# Patient Record
Sex: FEMALE | Race: WHITE | Employment: OTHER | URBAN - NONMETROPOLITAN AREA
[De-identification: names, ages, dates, MRNs, and addresses within clinical notes are randomized per-mention and may not be internally consistent; named-entity substitution may affect disease eponyms.]

---

## 2017-07-31 ENCOUNTER — HOSPITAL ENCOUNTER (OUTPATIENT)
Age: 52
Discharge: HOME | End: 2017-07-31
Payer: COMMERCIAL

## 2017-07-31 LAB
-: NORMAL
ABSOLUTE EOS #: 0.2 K/UL (ref 0–0.4)
ABSOLUTE LYMPH #: 2.2 K/UL (ref 1–4.8)
ABSOLUTE MONO #: 0.2 K/UL (ref 0–1)
ALBUMIN SERPL-MCNC: 4 G/DL (ref 3.5–5.2)
ALBUMIN/GLOBULIN RATIO: 1.1 (ref 1–2.5)
ALP BLD-CCNC: 88 U/L (ref 35–104)
ALT SERPL-CCNC: 8 U/L (ref 5–33)
AMORPHOUS: NORMAL
ANION GAP SERPL CALCULATED.3IONS-SCNC: 11 MMOL/L (ref 9–17)
AST SERPL-CCNC: 16 U/L
BACTERIA: NORMAL
BASOPHILS # BLD: 1 %
BASOPHILS ABSOLUTE: 0 K/UL (ref 0–0.2)
BILIRUB SERPL-MCNC: 0.3 MG/DL (ref 0.3–1.2)
BILIRUBIN URINE: NEGATIVE
BUN BLDV-MCNC: 8 MG/DL (ref 6–20)
BUN/CREAT BLD: 8 (ref 9–20)
CALCIUM SERPL-MCNC: 9 MG/DL (ref 8.6–10.4)
CASTS UA: NORMAL /LPF
CHLORIDE BLD-SCNC: 107 MMOL/L (ref 98–107)
CHOLESTEROL/HDL RATIO: 4.3
CHOLESTEROL: 205 MG/DL
CO2: 21 MMOL/L (ref 20–31)
COLOR: YELLOW
COMMENT UA: ABNORMAL
CREAT SERPL-MCNC: 1 MG/DL (ref 0.5–0.9)
CREATININE URINE: 44.1 MG/DL (ref 28–217)
CRYSTALS, UA: NORMAL /HPF
DIFFERENTIAL TYPE: ABNORMAL
EOSINOPHILS RELATIVE PERCENT: 4 %
EPITHELIAL CELLS UA: NORMAL /HPF (ref 0–25)
ESTIMATED AVERAGE GLUCOSE: 94 MG/DL
GFR AFRICAN AMERICAN: >60 ML/MIN
GFR NON-AFRICAN AMERICAN: 58 ML/MIN
GFR SERPL CREATININE-BSD FRML MDRD: ABNORMAL ML/MIN/{1.73_M2}
GFR SERPL CREATININE-BSD FRML MDRD: ABNORMAL ML/MIN/{1.73_M2}
GLUCOSE BLD-MCNC: 90 MG/DL (ref 70–99)
GLUCOSE URINE: NEGATIVE
HBA1C MFR BLD: 4.9 % (ref 4.8–5.9)
HCT VFR BLD CALC: 36.3 % (ref 36–46)
HDLC SERPL-MCNC: 48 MG/DL
HEMOGLOBIN: 12.1 G/DL (ref 12–16)
KETONES, URINE: NEGATIVE
LDL CHOLESTEROL: 119 MG/DL (ref 0–130)
LEUKOCYTE ESTERASE, URINE: NEGATIVE
LYMPHOCYTES # BLD: 45 %
MCH RBC QN AUTO: 31.3 PG (ref 26–34)
MCHC RBC AUTO-ENTMCNC: 33.3 G/DL (ref 31–37)
MCV RBC AUTO: 93.8 FL (ref 80–100)
MICROALBUMIN/CREAT 24H UR: <12 MG/L
MICROALBUMIN/CREAT UR-RTO: 27 MCG/MG CREAT
MONOCYTES # BLD: 4 %
MUCUS: NORMAL
NITRITE, URINE: NEGATIVE
OTHER OBSERVATIONS UA: NORMAL
PDW BLD-RTO: 14.8 % (ref 12.1–15.2)
PH UA: 6 (ref 5–9)
PLATELET # BLD: 122 K/UL (ref 140–450)
PLATELET ESTIMATE: ABNORMAL
PMV BLD AUTO: 9.8 FL (ref 6–12)
POTASSIUM SERPL-SCNC: 4.3 MMOL/L (ref 3.7–5.3)
PROTEIN UA: NEGATIVE
RBC # BLD: 3.86 M/UL (ref 4–5.2)
RBC # BLD: ABNORMAL 10*6/UL
RBC UA: NORMAL /HPF (ref 0–2)
RENAL EPITHELIAL, UA: NORMAL /HPF
SEG NEUTROPHILS: 46 %
SEGMENTED NEUTROPHILS ABSOLUTE COUNT: 2.3 K/UL (ref 1.8–7.7)
SODIUM BLD-SCNC: 139 MMOL/L (ref 135–144)
SPECIFIC GRAVITY UA: <1.005 (ref 1.01–1.02)
TOTAL PROTEIN: 7.6 G/DL (ref 6.4–8.3)
TRICHOMONAS: NORMAL
TRIGL SERPL-MCNC: 190 MG/DL
TURBIDITY: CLEAR
URINE HGB: NEGATIVE
UROBILINOGEN, URINE: NORMAL
VLDLC SERPL CALC-MCNC: ABNORMAL MG/DL (ref 1–30)
WBC # BLD: 5 K/UL (ref 3.5–11)
WBC # BLD: ABNORMAL 10*3/UL
WBC UA: NORMAL /HPF (ref 0–5)
YEAST: NORMAL

## 2017-07-31 PROCEDURE — 36415 COLL VENOUS BLD VENIPUNCTURE: CPT

## 2017-07-31 PROCEDURE — 83036 HEMOGLOBIN GLYCOSYLATED A1C: CPT

## 2017-07-31 PROCEDURE — 81001 URINALYSIS AUTO W/SCOPE: CPT

## 2017-07-31 PROCEDURE — 80053 COMPREHEN METABOLIC PANEL: CPT

## 2017-07-31 PROCEDURE — 82570 ASSAY OF URINE CREATININE: CPT

## 2017-07-31 PROCEDURE — 85025 COMPLETE CBC W/AUTO DIFF WBC: CPT

## 2017-07-31 PROCEDURE — 82043 UR ALBUMIN QUANTITATIVE: CPT

## 2017-07-31 PROCEDURE — 80061 LIPID PANEL: CPT

## 2017-08-01 ENCOUNTER — TELEPHONE (OUTPATIENT)
Dept: GASTROENTEROLOGY | Age: 52
End: 2017-08-01

## 2017-08-01 DIAGNOSIS — Z12.11 COLON CANCER SCREENING: Primary | ICD-10-CM

## 2017-08-03 PROBLEM — Z12.11 COLON CANCER SCREENING: Status: ACTIVE | Noted: 2017-08-03

## 2017-08-03 RX ORDER — SODIUM, POTASSIUM,MAG SULFATES 17.5-3.13G
SOLUTION, RECONSTITUTED, ORAL ORAL
Qty: 2 BOTTLE | Refills: 0 | OUTPATIENT
Start: 2017-08-03 | End: 2018-05-10 | Stop reason: ALTCHOICE

## 2018-02-05 ENCOUNTER — HOSPITAL ENCOUNTER (OUTPATIENT)
Dept: GENERAL RADIOLOGY | Age: 53
Discharge: HOME | End: 2018-02-07
Payer: COMMERCIAL

## 2018-02-05 ENCOUNTER — HOSPITAL ENCOUNTER (OUTPATIENT)
Age: 53
Discharge: HOME | End: 2018-02-07
Payer: COMMERCIAL

## 2018-02-05 DIAGNOSIS — R07.9 CHEST PAIN, UNSPECIFIED TYPE: ICD-10-CM

## 2018-02-05 DIAGNOSIS — R06.02 BREATH SHORTNESS: ICD-10-CM

## 2018-02-05 DIAGNOSIS — J44.9 CHRONIC OBSTRUCTIVE PULMONARY DISEASE, UNSPECIFIED COPD TYPE (HCC): ICD-10-CM

## 2018-02-05 PROCEDURE — 71046 X-RAY EXAM CHEST 2 VIEWS: CPT

## 2018-03-09 ENCOUNTER — HOSPITAL ENCOUNTER (OUTPATIENT)
Dept: ULTRASOUND IMAGING | Age: 53
Discharge: HOME | End: 2018-03-11
Payer: COMMERCIAL

## 2018-03-09 DIAGNOSIS — N93.0 POSTCOITAL BLEEDING: ICD-10-CM

## 2018-03-09 PROCEDURE — 76856 US EXAM PELVIC COMPLETE: CPT

## 2018-05-08 DIAGNOSIS — Z95.2 S/P AVR (AORTIC VALVE REPLACEMENT): ICD-10-CM

## 2018-05-08 DIAGNOSIS — Z79.01 LONG TERM CURRENT USE OF ANTICOAGULANT THERAPY: ICD-10-CM

## 2018-05-10 ENCOUNTER — HOSPITAL ENCOUNTER (OUTPATIENT)
Dept: PHARMACY | Age: 53
Setting detail: THERAPIES SERIES
Discharge: HOME | End: 2018-05-10
Payer: COMMERCIAL

## 2018-05-10 DIAGNOSIS — Z95.2 S/P AVR (AORTIC VALVE REPLACEMENT): ICD-10-CM

## 2018-05-10 DIAGNOSIS — Z79.01 LONG TERM CURRENT USE OF ANTICOAGULANT THERAPY: ICD-10-CM

## 2018-05-10 LAB — INR BLD: 1.9

## 2018-05-10 PROCEDURE — 99211 OFF/OP EST MAY X REQ PHY/QHP: CPT | Performed by: FAMILY MEDICINE

## 2018-05-10 PROCEDURE — 85610 PROTHROMBIN TIME: CPT | Performed by: FAMILY MEDICINE

## 2018-05-10 RX ORDER — ALPRAZOLAM 0.5 MG/1
0.5 TABLET ORAL DAILY PRN
COMMUNITY
Start: 2018-04-13

## 2018-05-10 RX ORDER — CITALOPRAM 20 MG/1
20 TABLET ORAL DAILY
COMMUNITY
Start: 2018-05-03 | End: 2019-06-07 | Stop reason: DRUGHIGH

## 2018-05-10 RX ORDER — ZONISAMIDE 100 MG/1
200 CAPSULE ORAL DAILY
COMMUNITY
Start: 2017-11-01 | End: 2022-10-27 | Stop reason: ALTCHOICE

## 2018-05-10 RX ORDER — WARFARIN SODIUM 2.5 MG/1
2.5 TABLET ORAL SEE ADMIN INSTRUCTIONS
COMMUNITY
Start: 2018-05-03

## 2018-05-10 RX ORDER — METOPROLOL SUCCINATE 25 MG/1
25 TABLET, EXTENDED RELEASE ORAL 2 TIMES DAILY
COMMUNITY
Start: 2018-05-02

## 2018-05-10 RX ORDER — ACETAMINOPHEN 325 MG/1
650 TABLET ORAL EVERY 4 HOURS PRN
COMMUNITY
Start: 2018-05-02

## 2018-05-10 RX ORDER — ASCORBIC ACID 500 MG
500 TABLET ORAL DAILY
COMMUNITY
Start: 2018-05-03 | End: 2018-06-01 | Stop reason: ALTCHOICE

## 2018-05-10 RX ORDER — ROSUVASTATIN CALCIUM 20 MG/1
20 TABLET, COATED ORAL NIGHTLY
COMMUNITY
Start: 2018-05-02

## 2018-05-10 RX ORDER — ALBUTEROL SULFATE 90 UG/1
2 AEROSOL, METERED RESPIRATORY (INHALATION) EVERY 6 HOURS PRN
COMMUNITY
Start: 2018-05-02

## 2018-05-10 RX ORDER — FOLIC ACID 1 MG/1
1 TABLET ORAL DAILY
COMMUNITY
Start: 2018-05-03 | End: 2018-06-01 | Stop reason: ALTCHOICE

## 2018-05-10 RX ORDER — OMEPRAZOLE 20 MG/1
20 CAPSULE, DELAYED RELEASE ORAL DAILY
COMMUNITY
Start: 2018-02-01

## 2018-05-10 RX ORDER — ARIPIPRAZOLE 5 MG/1
10 TABLET ORAL DAILY
COMMUNITY
Start: 2018-04-06 | End: 2020-10-07 | Stop reason: DRUGHIGH

## 2018-05-10 RX ORDER — TOPIRAMATE 50 MG/1
50 TABLET, FILM COATED ORAL 2 TIMES DAILY
COMMUNITY
Start: 2018-04-13 | End: 2020-10-21 | Stop reason: ALTCHOICE

## 2018-05-10 RX ORDER — ASPIRIN 81 MG
1 TABLET, DELAYED RELEASE (ENTERIC COATED) ORAL DAILY
COMMUNITY
Start: 2018-05-03 | End: 2018-06-01 | Stop reason: ALTCHOICE

## 2018-05-10 RX ORDER — OXYCODONE HYDROCHLORIDE 5 MG/1
5 TABLET ORAL EVERY 6 HOURS PRN
COMMUNITY
End: 2018-06-01 | Stop reason: ALTCHOICE

## 2018-05-10 RX ORDER — IRON POLYSACCHARIDE COMPLEX 150 MG
150 CAPSULE ORAL DAILY
COMMUNITY
Start: 2018-05-03 | End: 2018-06-01 | Stop reason: ALTCHOICE

## 2018-05-10 RX ORDER — DOCUSATE SODIUM 100 MG/1
100 CAPSULE, LIQUID FILLED ORAL 2 TIMES DAILY
COMMUNITY
Start: 2018-05-02 | End: 2018-06-01 | Stop reason: ALTCHOICE

## 2018-05-24 ENCOUNTER — HOSPITAL ENCOUNTER (OUTPATIENT)
Dept: PHARMACY | Age: 53
Setting detail: THERAPIES SERIES
Discharge: HOME | End: 2018-05-24
Payer: COMMERCIAL

## 2018-05-24 VITALS
WEIGHT: 163 LBS | DIASTOLIC BLOOD PRESSURE: 66 MMHG | BODY MASS INDEX: 25.53 KG/M2 | HEART RATE: 95 BPM | SYSTOLIC BLOOD PRESSURE: 107 MMHG

## 2018-05-24 DIAGNOSIS — Z95.2 S/P AVR (AORTIC VALVE REPLACEMENT): ICD-10-CM

## 2018-05-24 DIAGNOSIS — Z79.01 LONG TERM CURRENT USE OF ANTICOAGULANT THERAPY: ICD-10-CM

## 2018-05-24 LAB — INR BLD: 1.5

## 2018-05-24 PROCEDURE — 99211 OFF/OP EST MAY X REQ PHY/QHP: CPT

## 2018-05-24 PROCEDURE — 85610 PROTHROMBIN TIME: CPT

## 2018-06-01 ENCOUNTER — HOSPITAL ENCOUNTER (OUTPATIENT)
Dept: PHARMACY | Age: 53
Setting detail: THERAPIES SERIES
Discharge: HOME | End: 2018-06-01
Payer: COMMERCIAL

## 2018-06-01 ENCOUNTER — OFFICE VISIT (OUTPATIENT)
Dept: CARDIOLOGY | Age: 53
End: 2018-06-01
Payer: COMMERCIAL

## 2018-06-01 VITALS
OXYGEN SATURATION: 98 % | SYSTOLIC BLOOD PRESSURE: 91 MMHG | WEIGHT: 168.4 LBS | DIASTOLIC BLOOD PRESSURE: 61 MMHG | HEART RATE: 94 BPM | RESPIRATION RATE: 16 BRPM | HEIGHT: 67 IN | BODY MASS INDEX: 26.43 KG/M2

## 2018-06-01 VITALS — DIASTOLIC BLOOD PRESSURE: 61 MMHG | SYSTOLIC BLOOD PRESSURE: 91 MMHG

## 2018-06-01 DIAGNOSIS — Z86.73 HISTORY OF STROKE: ICD-10-CM

## 2018-06-01 DIAGNOSIS — Z79.01 LONG TERM CURRENT USE OF ANTICOAGULANT THERAPY: ICD-10-CM

## 2018-06-01 DIAGNOSIS — D68.61 ANTIPHOSPHOLIPID SYNDROME (HCC): ICD-10-CM

## 2018-06-01 DIAGNOSIS — Z95.2 S/P AVR (AORTIC VALVE REPLACEMENT): Primary | ICD-10-CM

## 2018-06-01 DIAGNOSIS — Z95.2 S/P AVR (AORTIC VALVE REPLACEMENT): ICD-10-CM

## 2018-06-01 LAB — INR BLD: 1.8

## 2018-06-01 PROCEDURE — G8419 CALC BMI OUT NRM PARAM NOF/U: HCPCS | Performed by: INTERNAL MEDICINE

## 2018-06-01 PROCEDURE — 99244 OFF/OP CNSLTJ NEW/EST MOD 40: CPT | Performed by: INTERNAL MEDICINE

## 2018-06-01 PROCEDURE — 99211 OFF/OP EST MAY X REQ PHY/QHP: CPT

## 2018-06-01 PROCEDURE — G8427 DOCREV CUR MEDS BY ELIG CLIN: HCPCS | Performed by: INTERNAL MEDICINE

## 2018-06-01 PROCEDURE — 3017F COLORECTAL CA SCREEN DOC REV: CPT | Performed by: INTERNAL MEDICINE

## 2018-06-01 PROCEDURE — 85610 PROTHROMBIN TIME: CPT

## 2018-06-08 ENCOUNTER — HOSPITAL ENCOUNTER (OUTPATIENT)
Dept: PHARMACY | Age: 53
Setting detail: THERAPIES SERIES
Discharge: HOME | End: 2018-06-08
Payer: COMMERCIAL

## 2018-06-08 VITALS
BODY MASS INDEX: 24.43 KG/M2 | DIASTOLIC BLOOD PRESSURE: 69 MMHG | HEART RATE: 94 BPM | WEIGHT: 156 LBS | SYSTOLIC BLOOD PRESSURE: 96 MMHG

## 2018-06-08 DIAGNOSIS — Z95.2 S/P AVR (AORTIC VALVE REPLACEMENT): ICD-10-CM

## 2018-06-08 DIAGNOSIS — Z79.01 LONG TERM CURRENT USE OF ANTICOAGULANT THERAPY: ICD-10-CM

## 2018-06-08 LAB — INR BLD: 1.8

## 2018-06-08 PROCEDURE — 99211 OFF/OP EST MAY X REQ PHY/QHP: CPT

## 2018-06-08 PROCEDURE — 85610 PROTHROMBIN TIME: CPT

## 2018-06-11 ENCOUNTER — HOSPITAL ENCOUNTER (OUTPATIENT)
Dept: CARDIAC REHAB | Age: 53
Setting detail: THERAPIES SERIES
Discharge: HOME | End: 2018-06-11
Payer: COMMERCIAL

## 2018-06-11 VITALS
OXYGEN SATURATION: 98 % | SYSTOLIC BLOOD PRESSURE: 100 MMHG | DIASTOLIC BLOOD PRESSURE: 66 MMHG | WEIGHT: 161 LBS | HEART RATE: 86 BPM | BODY MASS INDEX: 25.27 KG/M2 | RESPIRATION RATE: 16 BRPM | HEIGHT: 67 IN

## 2018-06-11 ASSESSMENT — PATIENT HEALTH QUESTIONNAIRE - PHQ9: SUM OF ALL RESPONSES TO PHQ QUESTIONS 1-9: 0

## 2018-06-13 ENCOUNTER — HOSPITAL ENCOUNTER (OUTPATIENT)
Dept: CARDIAC REHAB | Age: 53
Setting detail: THERAPIES SERIES
Discharge: HOME | End: 2018-06-13
Payer: COMMERCIAL

## 2018-06-13 PROCEDURE — 93798 PHYS/QHP OP CAR RHAB W/ECG: CPT

## 2018-06-14 ENCOUNTER — HOSPITAL ENCOUNTER (OUTPATIENT)
Dept: CARDIAC REHAB | Age: 53
Setting detail: THERAPIES SERIES
Discharge: HOME | End: 2018-06-14
Payer: COMMERCIAL

## 2018-06-14 PROCEDURE — 93798 PHYS/QHP OP CAR RHAB W/ECG: CPT

## 2018-06-18 ENCOUNTER — HOSPITAL ENCOUNTER (OUTPATIENT)
Dept: CARDIAC REHAB | Age: 53
Setting detail: THERAPIES SERIES
Discharge: HOME | End: 2018-06-18
Payer: COMMERCIAL

## 2018-06-18 PROCEDURE — 93798 PHYS/QHP OP CAR RHAB W/ECG: CPT

## 2018-06-20 ENCOUNTER — HOSPITAL ENCOUNTER (OUTPATIENT)
Dept: CARDIAC REHAB | Age: 53
Setting detail: THERAPIES SERIES
Discharge: HOME | End: 2018-06-20
Payer: COMMERCIAL

## 2018-06-20 PROCEDURE — 93798 PHYS/QHP OP CAR RHAB W/ECG: CPT

## 2018-06-21 ENCOUNTER — HOSPITAL ENCOUNTER (OUTPATIENT)
Dept: CARDIAC REHAB | Age: 53
Setting detail: THERAPIES SERIES
Discharge: HOME | End: 2018-06-21
Payer: COMMERCIAL

## 2018-06-21 PROCEDURE — 93798 PHYS/QHP OP CAR RHAB W/ECG: CPT

## 2018-06-22 ENCOUNTER — HOSPITAL ENCOUNTER (OUTPATIENT)
Dept: PHARMACY | Age: 53
Setting detail: THERAPIES SERIES
Discharge: HOME | End: 2018-06-22
Payer: COMMERCIAL

## 2018-06-22 VITALS
SYSTOLIC BLOOD PRESSURE: 92 MMHG | WEIGHT: 167 LBS | DIASTOLIC BLOOD PRESSURE: 64 MMHG | BODY MASS INDEX: 26.16 KG/M2 | HEART RATE: 78 BPM

## 2018-06-22 DIAGNOSIS — Z79.01 LONG TERM CURRENT USE OF ANTICOAGULANT THERAPY: ICD-10-CM

## 2018-06-22 DIAGNOSIS — Z95.2 S/P AVR (AORTIC VALVE REPLACEMENT): ICD-10-CM

## 2018-06-22 LAB — INR BLD: 1.7

## 2018-06-22 PROCEDURE — 85610 PROTHROMBIN TIME: CPT

## 2018-06-22 PROCEDURE — 99211 OFF/OP EST MAY X REQ PHY/QHP: CPT

## 2018-06-25 ENCOUNTER — HOSPITAL ENCOUNTER (OUTPATIENT)
Dept: CARDIAC REHAB | Age: 53
Setting detail: THERAPIES SERIES
Discharge: HOME | End: 2018-06-25
Payer: COMMERCIAL

## 2018-06-25 PROCEDURE — 93798 PHYS/QHP OP CAR RHAB W/ECG: CPT

## 2018-06-27 ENCOUNTER — HOSPITAL ENCOUNTER (OUTPATIENT)
Dept: CARDIAC REHAB | Age: 53
Setting detail: THERAPIES SERIES
Discharge: HOME | End: 2018-06-27
Payer: COMMERCIAL

## 2018-06-27 PROCEDURE — 93798 PHYS/QHP OP CAR RHAB W/ECG: CPT

## 2018-06-28 ENCOUNTER — HOSPITAL ENCOUNTER (OUTPATIENT)
Dept: CARDIAC REHAB | Age: 53
Setting detail: THERAPIES SERIES
Discharge: HOME | End: 2018-06-28
Payer: COMMERCIAL

## 2018-06-28 PROCEDURE — 93798 PHYS/QHP OP CAR RHAB W/ECG: CPT

## 2018-07-02 ENCOUNTER — HOSPITAL ENCOUNTER (OUTPATIENT)
Dept: CARDIAC REHAB | Age: 53
Setting detail: THERAPIES SERIES
Discharge: HOME | End: 2018-07-02
Payer: COMMERCIAL

## 2018-07-02 PROCEDURE — 93798 PHYS/QHP OP CAR RHAB W/ECG: CPT

## 2018-07-05 ENCOUNTER — HOSPITAL ENCOUNTER (OUTPATIENT)
Dept: PHARMACY | Age: 53
Setting detail: THERAPIES SERIES
Discharge: HOME | End: 2018-07-05
Payer: COMMERCIAL

## 2018-07-05 ENCOUNTER — HOSPITAL ENCOUNTER (OUTPATIENT)
Dept: CARDIAC REHAB | Age: 53
Setting detail: THERAPIES SERIES
Discharge: HOME | End: 2018-07-05
Payer: COMMERCIAL

## 2018-07-05 VITALS
BODY MASS INDEX: 26 KG/M2 | DIASTOLIC BLOOD PRESSURE: 59 MMHG | SYSTOLIC BLOOD PRESSURE: 112 MMHG | HEART RATE: 79 BPM | WEIGHT: 166 LBS

## 2018-07-05 DIAGNOSIS — Z95.2 S/P AVR (AORTIC VALVE REPLACEMENT): ICD-10-CM

## 2018-07-05 DIAGNOSIS — Z79.01 LONG TERM CURRENT USE OF ANTICOAGULANT THERAPY: ICD-10-CM

## 2018-07-05 LAB — INR BLD: 3.4

## 2018-07-05 PROCEDURE — 99211 OFF/OP EST MAY X REQ PHY/QHP: CPT

## 2018-07-05 PROCEDURE — 85610 PROTHROMBIN TIME: CPT

## 2018-07-05 PROCEDURE — 93798 PHYS/QHP OP CAR RHAB W/ECG: CPT

## 2018-07-05 NOTE — PROGRESS NOTES
Patient states no visible blood in urine and no black tarry stool. No change in other medications. Will return to clinic in 2 weeks. Patient will hold warfarin dose today then decrease dosage slightly to 2.5 mg on Monday and 5 mg all other days.

## 2018-07-09 ENCOUNTER — HOSPITAL ENCOUNTER (OUTPATIENT)
Dept: CARDIAC REHAB | Age: 53
Setting detail: THERAPIES SERIES
Discharge: HOME | End: 2018-07-09
Payer: COMMERCIAL

## 2018-07-09 PROCEDURE — 93798 PHYS/QHP OP CAR RHAB W/ECG: CPT

## 2018-07-11 ENCOUNTER — HOSPITAL ENCOUNTER (OUTPATIENT)
Dept: CARDIAC REHAB | Age: 53
Setting detail: THERAPIES SERIES
Discharge: HOME | End: 2018-07-11
Payer: COMMERCIAL

## 2018-07-11 PROCEDURE — 93798 PHYS/QHP OP CAR RHAB W/ECG: CPT

## 2018-07-12 ENCOUNTER — HOSPITAL ENCOUNTER (OUTPATIENT)
Dept: CARDIAC REHAB | Age: 53
Setting detail: THERAPIES SERIES
Discharge: HOME | End: 2018-07-12
Payer: COMMERCIAL

## 2018-07-12 ENCOUNTER — HOSPITAL ENCOUNTER (OUTPATIENT)
Dept: NUTRITION | Age: 53
Discharge: HOME | End: 2018-07-12
Payer: COMMERCIAL

## 2018-07-12 PROCEDURE — 93798 PHYS/QHP OP CAR RHAB W/ECG: CPT

## 2018-07-16 ENCOUNTER — HOSPITAL ENCOUNTER (OUTPATIENT)
Dept: CARDIAC REHAB | Age: 53
Setting detail: THERAPIES SERIES
Discharge: HOME | End: 2018-07-16
Payer: COMMERCIAL

## 2018-07-16 PROCEDURE — 93798 PHYS/QHP OP CAR RHAB W/ECG: CPT

## 2018-07-18 ENCOUNTER — HOSPITAL ENCOUNTER (OUTPATIENT)
Dept: CARDIAC REHAB | Age: 53
Setting detail: THERAPIES SERIES
Discharge: HOME | End: 2018-07-18
Payer: COMMERCIAL

## 2018-07-18 PROCEDURE — 93798 PHYS/QHP OP CAR RHAB W/ECG: CPT

## 2018-07-19 ENCOUNTER — HOSPITAL ENCOUNTER (OUTPATIENT)
Dept: PHARMACY | Age: 53
Setting detail: THERAPIES SERIES
Discharge: HOME | End: 2018-07-19
Payer: COMMERCIAL

## 2018-07-19 ENCOUNTER — HOSPITAL ENCOUNTER (OUTPATIENT)
Dept: CARDIAC REHAB | Age: 53
Setting detail: THERAPIES SERIES
Discharge: HOME | End: 2018-07-19
Payer: COMMERCIAL

## 2018-07-19 DIAGNOSIS — Z95.2 S/P AVR (AORTIC VALVE REPLACEMENT): ICD-10-CM

## 2018-07-19 DIAGNOSIS — Z79.01 LONG TERM CURRENT USE OF ANTICOAGULANT THERAPY: ICD-10-CM

## 2018-07-19 LAB — INR BLD: 3.1

## 2018-07-19 PROCEDURE — 85610 PROTHROMBIN TIME: CPT | Performed by: FAMILY MEDICINE

## 2018-07-19 PROCEDURE — 99211 OFF/OP EST MAY X REQ PHY/QHP: CPT | Performed by: FAMILY MEDICINE

## 2018-07-19 PROCEDURE — 93798 PHYS/QHP OP CAR RHAB W/ECG: CPT

## 2018-07-23 ENCOUNTER — HOSPITAL ENCOUNTER (OUTPATIENT)
Dept: CARDIAC REHAB | Age: 53
Setting detail: THERAPIES SERIES
Discharge: HOME | End: 2018-07-23
Payer: COMMERCIAL

## 2018-07-23 PROCEDURE — 93798 PHYS/QHP OP CAR RHAB W/ECG: CPT

## 2018-07-25 ENCOUNTER — HOSPITAL ENCOUNTER (OUTPATIENT)
Dept: CARDIAC REHAB | Age: 53
Setting detail: THERAPIES SERIES
Discharge: HOME | End: 2018-07-25
Payer: COMMERCIAL

## 2018-07-25 PROCEDURE — 93798 PHYS/QHP OP CAR RHAB W/ECG: CPT

## 2018-07-26 ENCOUNTER — HOSPITAL ENCOUNTER (OUTPATIENT)
Dept: CARDIAC REHAB | Age: 53
Setting detail: THERAPIES SERIES
Discharge: HOME | End: 2018-07-26
Payer: COMMERCIAL

## 2018-07-26 PROCEDURE — 93798 PHYS/QHP OP CAR RHAB W/ECG: CPT

## 2018-07-30 ENCOUNTER — HOSPITAL ENCOUNTER (OUTPATIENT)
Dept: PHARMACY | Age: 53
Setting detail: THERAPIES SERIES
Discharge: HOME | End: 2018-07-30
Payer: COMMERCIAL

## 2018-07-30 ENCOUNTER — HOSPITAL ENCOUNTER (OUTPATIENT)
Dept: CARDIAC REHAB | Age: 53
Setting detail: THERAPIES SERIES
Discharge: HOME | End: 2018-07-30
Payer: COMMERCIAL

## 2018-07-30 VITALS
SYSTOLIC BLOOD PRESSURE: 92 MMHG | HEART RATE: 83 BPM | BODY MASS INDEX: 26 KG/M2 | DIASTOLIC BLOOD PRESSURE: 63 MMHG | WEIGHT: 166 LBS

## 2018-07-30 DIAGNOSIS — Z95.2 S/P AVR (AORTIC VALVE REPLACEMENT): ICD-10-CM

## 2018-07-30 DIAGNOSIS — Z79.01 LONG TERM CURRENT USE OF ANTICOAGULANT THERAPY: ICD-10-CM

## 2018-07-30 LAB — INR BLD: 2

## 2018-07-30 PROCEDURE — 93798 PHYS/QHP OP CAR RHAB W/ECG: CPT

## 2018-07-30 PROCEDURE — 99211 OFF/OP EST MAY X REQ PHY/QHP: CPT

## 2018-07-30 PROCEDURE — 85610 PROTHROMBIN TIME: CPT

## 2018-07-30 NOTE — PROGRESS NOTES
Patient states no visible blood in urine and no black tarry stool. No change in other medications. Will return to clinic in 2 weeks.

## 2018-07-30 NOTE — PATIENT INSTRUCTIONS
Continue to monitor urine and stool. Continue to monitor for signs of bleeding. Return to clinic in 2 weeks.

## 2018-08-01 ENCOUNTER — HOSPITAL ENCOUNTER (OUTPATIENT)
Dept: CARDIAC REHAB | Age: 53
Setting detail: THERAPIES SERIES
Discharge: HOME | End: 2018-08-01
Payer: COMMERCIAL

## 2018-08-01 PROCEDURE — 93798 PHYS/QHP OP CAR RHAB W/ECG: CPT

## 2018-08-02 ENCOUNTER — HOSPITAL ENCOUNTER (OUTPATIENT)
Dept: CARDIAC REHAB | Age: 53
Setting detail: THERAPIES SERIES
Discharge: HOME | End: 2018-08-02
Payer: COMMERCIAL

## 2018-08-02 PROCEDURE — 93798 PHYS/QHP OP CAR RHAB W/ECG: CPT

## 2018-08-08 ENCOUNTER — HOSPITAL ENCOUNTER (OUTPATIENT)
Dept: CARDIAC REHAB | Age: 53
Setting detail: THERAPIES SERIES
Discharge: HOME | End: 2018-08-08
Payer: COMMERCIAL

## 2018-08-08 PROCEDURE — 93798 PHYS/QHP OP CAR RHAB W/ECG: CPT

## 2018-08-09 ENCOUNTER — HOSPITAL ENCOUNTER (OUTPATIENT)
Dept: CARDIAC REHAB | Age: 53
Setting detail: THERAPIES SERIES
Discharge: HOME | End: 2018-08-09
Payer: COMMERCIAL

## 2018-08-09 PROCEDURE — 93798 PHYS/QHP OP CAR RHAB W/ECG: CPT

## 2018-08-13 ENCOUNTER — HOSPITAL ENCOUNTER (OUTPATIENT)
Dept: CARDIAC REHAB | Age: 53
Setting detail: THERAPIES SERIES
Discharge: HOME | End: 2018-08-13
Payer: COMMERCIAL

## 2018-08-13 PROCEDURE — 93798 PHYS/QHP OP CAR RHAB W/ECG: CPT

## 2018-08-15 ENCOUNTER — HOSPITAL ENCOUNTER (OUTPATIENT)
Dept: CARDIAC REHAB | Age: 53
Setting detail: THERAPIES SERIES
Discharge: HOME | End: 2018-08-15
Payer: COMMERCIAL

## 2018-08-15 PROCEDURE — 93798 PHYS/QHP OP CAR RHAB W/ECG: CPT

## 2018-08-16 ENCOUNTER — HOSPITAL ENCOUNTER (OUTPATIENT)
Dept: PHARMACY | Age: 53
Setting detail: THERAPIES SERIES
Discharge: HOME | End: 2018-08-16
Payer: COMMERCIAL

## 2018-08-16 ENCOUNTER — HOSPITAL ENCOUNTER (OUTPATIENT)
Dept: CARDIAC REHAB | Age: 53
Setting detail: THERAPIES SERIES
Discharge: HOME | End: 2018-08-16
Payer: COMMERCIAL

## 2018-08-16 VITALS
DIASTOLIC BLOOD PRESSURE: 59 MMHG | WEIGHT: 166 LBS | SYSTOLIC BLOOD PRESSURE: 92 MMHG | BODY MASS INDEX: 26 KG/M2 | HEART RATE: 81 BPM

## 2018-08-16 DIAGNOSIS — Z79.01 LONG TERM CURRENT USE OF ANTICOAGULANT THERAPY: ICD-10-CM

## 2018-08-16 DIAGNOSIS — Z95.2 S/P AVR (AORTIC VALVE REPLACEMENT): ICD-10-CM

## 2018-08-16 LAB — INR BLD: 2.6

## 2018-08-16 PROCEDURE — 99211 OFF/OP EST MAY X REQ PHY/QHP: CPT

## 2018-08-16 PROCEDURE — 85610 PROTHROMBIN TIME: CPT

## 2018-08-16 PROCEDURE — 93798 PHYS/QHP OP CAR RHAB W/ECG: CPT

## 2018-08-16 NOTE — PROGRESS NOTES
Patient states no visible blood in urine and no black tarry stool. No change in other medications. Will return to clinic in 4 weeks.

## 2018-08-20 ENCOUNTER — HOSPITAL ENCOUNTER (OUTPATIENT)
Dept: CARDIAC REHAB | Age: 53
Setting detail: THERAPIES SERIES
Discharge: HOME | End: 2018-08-20
Payer: COMMERCIAL

## 2018-08-20 PROCEDURE — 93798 PHYS/QHP OP CAR RHAB W/ECG: CPT

## 2018-08-22 ENCOUNTER — HOSPITAL ENCOUNTER (OUTPATIENT)
Dept: CARDIAC REHAB | Age: 53
Setting detail: THERAPIES SERIES
Discharge: HOME | End: 2018-08-22
Payer: COMMERCIAL

## 2018-08-22 PROCEDURE — 93798 PHYS/QHP OP CAR RHAB W/ECG: CPT

## 2018-08-23 ENCOUNTER — HOSPITAL ENCOUNTER (OUTPATIENT)
Dept: CARDIAC REHAB | Age: 53
Setting detail: THERAPIES SERIES
Discharge: HOME | End: 2018-08-23
Payer: COMMERCIAL

## 2018-08-23 PROCEDURE — 93798 PHYS/QHP OP CAR RHAB W/ECG: CPT

## 2018-08-29 ENCOUNTER — HOSPITAL ENCOUNTER (OUTPATIENT)
Dept: CARDIAC REHAB | Age: 53
Setting detail: THERAPIES SERIES
Discharge: HOME | End: 2018-08-29
Payer: COMMERCIAL

## 2018-08-29 PROCEDURE — 93798 PHYS/QHP OP CAR RHAB W/ECG: CPT

## 2018-08-30 ENCOUNTER — HOSPITAL ENCOUNTER (OUTPATIENT)
Dept: CARDIAC REHAB | Age: 53
Setting detail: THERAPIES SERIES
Discharge: HOME | End: 2018-08-30
Payer: COMMERCIAL

## 2018-08-30 PROCEDURE — 93798 PHYS/QHP OP CAR RHAB W/ECG: CPT

## 2018-09-05 ENCOUNTER — HOSPITAL ENCOUNTER (OUTPATIENT)
Dept: CARDIAC REHAB | Age: 53
Setting detail: THERAPIES SERIES
Discharge: HOME | End: 2018-09-05
Payer: COMMERCIAL

## 2018-09-05 PROCEDURE — 93798 PHYS/QHP OP CAR RHAB W/ECG: CPT

## 2018-09-06 ENCOUNTER — HOSPITAL ENCOUNTER (OUTPATIENT)
Dept: CARDIAC REHAB | Age: 53
Setting detail: THERAPIES SERIES
Discharge: HOME | End: 2018-09-06
Payer: COMMERCIAL

## 2018-09-06 PROCEDURE — 93798 PHYS/QHP OP CAR RHAB W/ECG: CPT

## 2018-09-07 NOTE — PROGRESS NOTES
Dietitian Consult       [x] Nurse/Patient Discussion     [x] Diet Class           [] Referred to Diabetes Education     Education:  [] S&S hypo/hyperglycemia  [x] Low fat/low cholesterol diet  [] Weight loss methods      [] Relate Diabetes/CAD     [x] Eating heart healthy handout    Target Goal:  -LDL-C<100 if triglycerides are > 200  -LDL-C < 70 for high risk patients  -HbA1c < 7%  -BMI < 25   Education    Stages of Change:    [] pre-contemplation   [x] Action   [] Contemplate   [] Maintainence   [x] Prep   [] Relapse    Family support: [x] Yes  [] No    Tobacco use: [] Yes  [x] No    Intervention:  [] Referred to smoking cessation counselor     [] Individual education and counseling  [] Tobacco adjunct  [] Informed of education class schedule     Education:   [x] Risk Factors/Modifications  [] Psychological aspects  [] Angina    [] Medications  [] CHF      [] Cardiac A&P    Target Goal:  -Complete cessation of tobacco use (if applicable)  -Continued risk factor modifications  -Recognizing signs/symptoms to report  -Proper use of meds    Psychosocial  Stages of Change:    [] pre-contemplation   [x] Action   [] Contemplate   [] Maintainence   [x] Prep   [] Relapse    Intervention:   [] Psych Consult/  [x] Uses stress management skills    [] Physician Referral    [] Stress management class   [] Med Change? Education:    [] Coping Techniques   [] Relaxation techniques   [] S/S of Depression    Target Goal:  -Assess presence or absence of depression using a valid screening tool. -Maximize coping skills.  -Positive support system.     Preventative Medication:   [x] Aspirin       [x] Beta Blockade      [x] Statin or other lipid lowering agent     [] Clopidogrel   [x] ACE Inhibitor   [x] Other anticoagulation medications     Fall Risk assess: [x] Yes  [] No  Assistive Device:   [] Cane  [] Donna Booze [] Wheel Chair  [] Gait belt    Patient/Program goal:   -increased stamina/strength to 30-50 total exercise by

## 2018-09-10 ENCOUNTER — HOSPITAL ENCOUNTER (OUTPATIENT)
Dept: CARDIAC REHAB | Age: 53
Setting detail: THERAPIES SERIES
Discharge: HOME | End: 2018-09-10
Payer: COMMERCIAL

## 2018-09-10 PROCEDURE — 93798 PHYS/QHP OP CAR RHAB W/ECG: CPT

## 2018-09-12 ENCOUNTER — HOSPITAL ENCOUNTER (OUTPATIENT)
Dept: CARDIAC REHAB | Age: 53
Setting detail: THERAPIES SERIES
Discharge: HOME | End: 2018-09-12
Payer: COMMERCIAL

## 2018-09-12 PROCEDURE — 93798 PHYS/QHP OP CAR RHAB W/ECG: CPT

## 2018-09-12 NOTE — PROGRESS NOTES
Phase II Cardiac Rehab Individualized Treatment Plan-Discharge    Patient Name: Todd Montoya  Date of Initial Assessment: 9/12/2018  ACCOUNT #: [de-identified]  Diagnosis: AVR   Onset Date: 4/24/18  Referring Physician: Dr. Nydia Kirkpatrick  Risk Stratification: mod  Session Number: 39   EXERCISE    Stages of Change:   [] pre-contemplation   [x] Action   [] Contemplate   [] Maintainence   [x] Prep   [] Relapse          Exercise Prescription:  Mode: [x] TM [x] B [x] STP [x] EL [x] R  Frequency: 3 x week  Duration: 31-60 minutes  Intensity: 2.4 mets  Progression:    [] Angina with Exertion THR:    [x] Resistance Training Weight (lbs):  2# Reps: 5-10    Hypertension:  [x] Yes  [] No  Resting BP: 122/76  Peak Exercise BP: 92/56  [] Med Change? Intervention:  Home Exercise:  Type: walking  Duration: 15 minutes  Frequency: daily   [] Resistance Training    Depression Screening:  [] Have you been feeling sad. ..down in the dumps? [] Have you lost interest in your job, sports, hobbies, friends? [] Do you often feel tired? [] Do you have trouble sleeping or do you sleep too much? [] Have you been gaining or losing weight? [] Do you often feel down on yourself, that everything is your fault? [] Do you have troubled making decisions or concentrating on your work? [] Do you often feel agitated or like you can barely move? [] Do you ever feel that life isn't worth living?    *If greater than 5 symptoms listed,  notified. Education:   [x] Education Goals met        Target Goal:   -Individual Exercise Plan  -BP<140/90 or <130/80 if DM   -Aerobic active 30 + minutes 5-7 days per week    Nutrition    Stages of Change:   [] pre-contemplation   [x] Action   [] Contemplate   [] Maintainenc   [x] Prep   [] Relapse    Lipids: 5/31/18  Total Cholesterol: 116  Triglycerides: 178  HDL: 51  LDL: 29  [] Med Change? Diabetes:  [] Yes  [x] No  FBS:   HbA1c:4.9   [] Med Change?   Random BS:   [] BS in range    Weight Management:  Weight: 164.3lb  Height: 5 ft 7 in  BMI: 26  Wt Goal: 1-2 lbs/wk  Special Diet: heart healthy    Intervention:   [x] Dietitian Consult       [x] Nurse/Patient Discussion     [] Diet Class           [] Referred to Diabetes Education     Education:  [x] Education Goals met    Target Goal:  -LDL-C<100 if triglycerides are > 200  -LDL-C < 70 for high risk patients  -HbA1c < 7%  -BMI < 25   Education    Stages of Change:    [] pre-contemplation   [x] Action   [] Contemplate   [] Maintainence   [x] Prep   [] Relapse    Knowledge test score: 100%      Family support: [] Yes  [] No    Tobacco use: [] Yes  [x] No    Intervention:  [] Referred to smoking cessation counselor     [x] Individual education and counseling  [] Tobacco adjunct  [] Informed of education class schedule     Education:   [x] Education goals met    Target Goal:  -Complete cessation of tobacco use (if applicable)  -Continued risk factor modifications  -Recognizing signs/symptoms to report  -Proper use of meds    Psychosocial  Stages of Change:    [] pre-contemplation   [x] Action   [] Contemplate   [] Maintainence   [x] Prep   [] Relapse    Psychosocial Test:  Tool Used: [employer] Quality of Life  Score: 30.00  Depression screening score: 0/10  []Medication change? Education:    [x] Education Goals met    Target Goal:  -Assess presence or absence of depression using a valid screening tool. -Maximize coping skills.  -Positive support system.     Preventative Medication:   [x] Aspirin       [x] Beta Blockade      [x] Statin or other lipid lowering agent     [] Clopidogrel   [] ACE Inhibitor   [x] Other anticoagulation medications     Fall Risk assess: [x] Yes  [] No  Assistive Device:   [] Cane  [] Walker [] Wheel Chair  [] Gait belt    Patient/Program goal:   -increased stamina/strength to 30-50 total exercise by increasing 1-2 level/wk and 1-2   min/wk  to achieve THR and RPE 11-13  Patient started exercise program at 1.6 mets and has completed the program at 2.4 mets. -introduce weights/ therabands 2-4# for 5-10 reps Patient completed program using 2# resistance for 5-10 reps. -manage BP better BP was stable throughout the program.   -improved cholesterol and  Triglycerides No new lipid results on file since 5/31/18. Patient encouraged to continue heart healthy diet and continue to take statin as prescribed.    -develop regular exercise 30 min daily Patient has a treadmill at home and plans to continue walking daily. She states she is currently walking @ 15 minutes each day and plans to increase to daily. Patient does not wish to return for phase III rehab.    -decreased shortness of breath Patient has been able to exercise without O2 and maintain her SAO2.   -lose 1-2# a week Patient's weight has been unchanged throughout sessions.      Physician Changes/Comments:      Cardiac Rehab Staff

## 2018-09-13 ENCOUNTER — HOSPITAL ENCOUNTER (OUTPATIENT)
Dept: PHARMACY | Age: 53
Setting detail: THERAPIES SERIES
Discharge: HOME | End: 2018-09-13
Payer: COMMERCIAL

## 2018-09-13 VITALS — HEART RATE: 66 BPM | DIASTOLIC BLOOD PRESSURE: 65 MMHG | SYSTOLIC BLOOD PRESSURE: 99 MMHG

## 2018-09-13 DIAGNOSIS — Z79.01 LONG TERM CURRENT USE OF ANTICOAGULANT THERAPY: ICD-10-CM

## 2018-09-13 DIAGNOSIS — Z95.2 S/P AVR (AORTIC VALVE REPLACEMENT): ICD-10-CM

## 2018-09-13 LAB — INR BLD: 1.9

## 2018-09-13 PROCEDURE — 99211 OFF/OP EST MAY X REQ PHY/QHP: CPT | Performed by: FAMILY MEDICINE

## 2018-09-13 PROCEDURE — 85610 PROTHROMBIN TIME: CPT | Performed by: FAMILY MEDICINE

## 2018-09-13 RX ORDER — NITROGLYCERIN 0.4 MG/1
0.4 TABLET SUBLINGUAL PRN
COMMUNITY

## 2018-09-26 PROBLEM — Z12.11 COLON CANCER SCREENING: Status: RESOLVED | Noted: 2017-08-03 | Resolved: 2018-09-26

## 2018-10-11 ENCOUNTER — HOSPITAL ENCOUNTER (OUTPATIENT)
Dept: PHARMACY | Age: 53
Setting detail: THERAPIES SERIES
Discharge: HOME | End: 2018-10-11
Payer: COMMERCIAL

## 2018-10-11 VITALS
BODY MASS INDEX: 26.16 KG/M2 | HEART RATE: 70 BPM | DIASTOLIC BLOOD PRESSURE: 76 MMHG | SYSTOLIC BLOOD PRESSURE: 116 MMHG | WEIGHT: 167 LBS

## 2018-10-11 DIAGNOSIS — Z79.01 LONG TERM CURRENT USE OF ANTICOAGULANT THERAPY: ICD-10-CM

## 2018-10-11 DIAGNOSIS — Z95.2 S/P AVR (AORTIC VALVE REPLACEMENT): ICD-10-CM

## 2018-10-11 LAB — INR BLD: 1.8

## 2018-10-11 PROCEDURE — 85610 PROTHROMBIN TIME: CPT

## 2018-10-11 PROCEDURE — 99211 OFF/OP EST MAY X REQ PHY/QHP: CPT

## 2018-11-07 ENCOUNTER — HOSPITAL ENCOUNTER (OUTPATIENT)
Dept: PHARMACY | Age: 53
Setting detail: THERAPIES SERIES
Discharge: HOME | End: 2018-11-07
Payer: COMMERCIAL

## 2018-11-07 VITALS — SYSTOLIC BLOOD PRESSURE: 103 MMHG | DIASTOLIC BLOOD PRESSURE: 59 MMHG | HEART RATE: 74 BPM

## 2018-11-07 DIAGNOSIS — Z79.01 LONG TERM CURRENT USE OF ANTICOAGULANT THERAPY: ICD-10-CM

## 2018-11-07 DIAGNOSIS — Z95.2 S/P AVR (AORTIC VALVE REPLACEMENT): ICD-10-CM

## 2018-11-07 LAB — INR BLD: 1.9

## 2018-11-07 PROCEDURE — 99211 OFF/OP EST MAY X REQ PHY/QHP: CPT | Performed by: FAMILY MEDICINE

## 2018-11-07 PROCEDURE — 85610 PROTHROMBIN TIME: CPT | Performed by: FAMILY MEDICINE

## 2018-11-28 ENCOUNTER — HOSPITAL ENCOUNTER (OUTPATIENT)
Dept: PHARMACY | Age: 53
Setting detail: THERAPIES SERIES
Discharge: HOME | End: 2018-11-28
Payer: COMMERCIAL

## 2018-11-28 VITALS
WEIGHT: 172 LBS | HEART RATE: 67 BPM | SYSTOLIC BLOOD PRESSURE: 107 MMHG | BODY MASS INDEX: 26.94 KG/M2 | DIASTOLIC BLOOD PRESSURE: 63 MMHG

## 2018-11-28 DIAGNOSIS — Z79.01 LONG TERM CURRENT USE OF ANTICOAGULANT THERAPY: ICD-10-CM

## 2018-11-28 DIAGNOSIS — Z95.2 S/P AVR (AORTIC VALVE REPLACEMENT): ICD-10-CM

## 2018-11-28 LAB — INR BLD: 2.3

## 2018-11-28 PROCEDURE — 85610 PROTHROMBIN TIME: CPT

## 2018-11-28 PROCEDURE — 99211 OFF/OP EST MAY X REQ PHY/QHP: CPT

## 2018-12-11 ENCOUNTER — OFFICE VISIT (OUTPATIENT)
Dept: CARDIOLOGY | Age: 53
End: 2018-12-11
Payer: COMMERCIAL

## 2018-12-11 VITALS
DIASTOLIC BLOOD PRESSURE: 63 MMHG | WEIGHT: 172.4 LBS | BODY MASS INDEX: 27.06 KG/M2 | SYSTOLIC BLOOD PRESSURE: 101 MMHG | HEIGHT: 67 IN | OXYGEN SATURATION: 99 % | HEART RATE: 79 BPM

## 2018-12-11 DIAGNOSIS — Z95.2 S/P AVR (AORTIC VALVE REPLACEMENT): Primary | ICD-10-CM

## 2018-12-11 DIAGNOSIS — Z86.73 HISTORY OF STROKE: ICD-10-CM

## 2018-12-11 DIAGNOSIS — E78.5 DYSLIPIDEMIA: ICD-10-CM

## 2018-12-11 DIAGNOSIS — Z79.01 LONG TERM CURRENT USE OF ANTICOAGULANT THERAPY: ICD-10-CM

## 2018-12-11 PROCEDURE — G8417 CALC BMI ABV UP PARAM F/U: HCPCS | Performed by: INTERNAL MEDICINE

## 2018-12-11 PROCEDURE — 93000 ELECTROCARDIOGRAM COMPLETE: CPT | Performed by: INTERNAL MEDICINE

## 2018-12-11 PROCEDURE — 99213 OFFICE O/P EST LOW 20 MIN: CPT | Performed by: INTERNAL MEDICINE

## 2018-12-11 PROCEDURE — G8484 FLU IMMUNIZE NO ADMIN: HCPCS | Performed by: INTERNAL MEDICINE

## 2018-12-11 PROCEDURE — 1036F TOBACCO NON-USER: CPT | Performed by: INTERNAL MEDICINE

## 2018-12-11 PROCEDURE — 3017F COLORECTAL CA SCREEN DOC REV: CPT | Performed by: INTERNAL MEDICINE

## 2018-12-11 PROCEDURE — G8427 DOCREV CUR MEDS BY ELIG CLIN: HCPCS | Performed by: INTERNAL MEDICINE

## 2018-12-19 ENCOUNTER — TELEPHONE (OUTPATIENT)
Dept: PHARMACY | Age: 53
End: 2018-12-19

## 2018-12-28 ENCOUNTER — HOSPITAL ENCOUNTER (OUTPATIENT)
Dept: NON INVASIVE DIAGNOSTICS | Age: 53
Discharge: HOME | End: 2018-12-28
Payer: COMMERCIAL

## 2018-12-28 DIAGNOSIS — Z95.2 S/P AVR (AORTIC VALVE REPLACEMENT): ICD-10-CM

## 2018-12-28 DIAGNOSIS — Z79.01 LONG TERM CURRENT USE OF ANTICOAGULANT THERAPY: ICD-10-CM

## 2018-12-28 LAB
LV EF: 60 %
LVEF MODALITY: NORMAL

## 2018-12-28 PROCEDURE — 93306 TTE W/DOPPLER COMPLETE: CPT

## 2018-12-31 ENCOUNTER — TELEPHONE (OUTPATIENT)
Dept: CARDIOLOGY | Age: 53
End: 2018-12-31

## 2019-01-09 ENCOUNTER — HOSPITAL ENCOUNTER (OUTPATIENT)
Dept: PHARMACY | Age: 54
Setting detail: THERAPIES SERIES
Discharge: HOME | End: 2019-01-09
Payer: COMMERCIAL

## 2019-01-09 VITALS
BODY MASS INDEX: 27.57 KG/M2 | SYSTOLIC BLOOD PRESSURE: 116 MMHG | WEIGHT: 176 LBS | DIASTOLIC BLOOD PRESSURE: 63 MMHG | HEART RATE: 68 BPM

## 2019-01-09 DIAGNOSIS — Z95.2 S/P AVR (AORTIC VALVE REPLACEMENT): ICD-10-CM

## 2019-01-09 DIAGNOSIS — Z79.01 LONG TERM CURRENT USE OF ANTICOAGULANT THERAPY: ICD-10-CM

## 2019-01-09 LAB — INR BLD: 3.9

## 2019-01-09 PROCEDURE — 99212 OFFICE O/P EST SF 10 MIN: CPT

## 2019-01-09 PROCEDURE — 85610 PROTHROMBIN TIME: CPT

## 2019-01-30 ENCOUNTER — APPOINTMENT (OUTPATIENT)
Dept: PHARMACY | Age: 54
End: 2019-01-30
Payer: COMMERCIAL

## 2019-02-07 ENCOUNTER — HOSPITAL ENCOUNTER (OUTPATIENT)
Dept: PHARMACY | Age: 54
Setting detail: THERAPIES SERIES
Discharge: HOME | End: 2019-02-07
Payer: COMMERCIAL

## 2019-02-07 VITALS
DIASTOLIC BLOOD PRESSURE: 65 MMHG | SYSTOLIC BLOOD PRESSURE: 99 MMHG | BODY MASS INDEX: 27.35 KG/M2 | HEART RATE: 71 BPM | WEIGHT: 174.6 LBS

## 2019-02-07 DIAGNOSIS — Z95.2 S/P AVR (AORTIC VALVE REPLACEMENT): ICD-10-CM

## 2019-02-07 DIAGNOSIS — Z79.01 LONG TERM CURRENT USE OF ANTICOAGULANT THERAPY: ICD-10-CM

## 2019-02-07 LAB — INR BLD: 2.8

## 2019-02-07 PROCEDURE — 99211 OFF/OP EST MAY X REQ PHY/QHP: CPT | Performed by: FAMILY MEDICINE

## 2019-02-07 PROCEDURE — 85610 PROTHROMBIN TIME: CPT | Performed by: FAMILY MEDICINE

## 2019-03-14 ENCOUNTER — HOSPITAL ENCOUNTER (OUTPATIENT)
Dept: PHARMACY | Age: 54
Setting detail: THERAPIES SERIES
Discharge: HOME | End: 2019-03-14
Payer: COMMERCIAL

## 2019-03-14 VITALS — DIASTOLIC BLOOD PRESSURE: 69 MMHG | SYSTOLIC BLOOD PRESSURE: 112 MMHG | HEART RATE: 66 BPM

## 2019-03-14 DIAGNOSIS — Z95.2 S/P AVR (AORTIC VALVE REPLACEMENT): ICD-10-CM

## 2019-03-14 DIAGNOSIS — Z79.01 LONG TERM CURRENT USE OF ANTICOAGULANT THERAPY: ICD-10-CM

## 2019-03-14 LAB — INR BLD: 2.6

## 2019-03-14 PROCEDURE — 99211 OFF/OP EST MAY X REQ PHY/QHP: CPT

## 2019-03-14 PROCEDURE — 85610 PROTHROMBIN TIME: CPT

## 2019-04-29 ENCOUNTER — HOSPITAL ENCOUNTER (OUTPATIENT)
Dept: PHARMACY | Age: 54
Setting detail: THERAPIES SERIES
Discharge: HOME | End: 2019-04-29
Payer: COMMERCIAL

## 2019-04-29 VITALS
BODY MASS INDEX: 28.04 KG/M2 | DIASTOLIC BLOOD PRESSURE: 68 MMHG | WEIGHT: 179 LBS | HEART RATE: 79 BPM | SYSTOLIC BLOOD PRESSURE: 109 MMHG

## 2019-04-29 DIAGNOSIS — Z95.2 S/P AVR (AORTIC VALVE REPLACEMENT): ICD-10-CM

## 2019-04-29 DIAGNOSIS — Z79.01 LONG TERM CURRENT USE OF ANTICOAGULANT THERAPY: ICD-10-CM

## 2019-04-29 LAB — INR BLD: 4.4

## 2019-04-29 PROCEDURE — 99212 OFFICE O/P EST SF 10 MIN: CPT

## 2019-04-29 PROCEDURE — 85610 PROTHROMBIN TIME: CPT

## 2019-04-29 NOTE — PROGRESS NOTES
Patient states no visible blood in urine and no black tarry stool. No change in other medications. Will return to clinic in 2 weeks. Patient finished a course of Cipro for a UTI a few days ago which could be causing the increased INR. Patient will hold warfarin for 2 days then resume previous stable dosage.

## 2019-04-29 NOTE — PATIENT INSTRUCTIONS
Continue to monitor urine and stool. Continue to monitor for signs of bleeding. Return to clinic in 2 weeks. Hold warfarin for 2 days then resume previous stable dosage.

## 2019-05-17 ENCOUNTER — HOSPITAL ENCOUNTER (OUTPATIENT)
Dept: PHARMACY | Age: 54
Setting detail: THERAPIES SERIES
Discharge: HOME | End: 2019-05-17
Payer: COMMERCIAL

## 2019-05-17 VITALS — DIASTOLIC BLOOD PRESSURE: 66 MMHG | HEART RATE: 79 BPM | SYSTOLIC BLOOD PRESSURE: 93 MMHG

## 2019-05-17 DIAGNOSIS — Z95.2 S/P AVR (AORTIC VALVE REPLACEMENT): ICD-10-CM

## 2019-05-17 DIAGNOSIS — Z79.01 LONG TERM CURRENT USE OF ANTICOAGULANT THERAPY: ICD-10-CM

## 2019-05-17 LAB — INR BLD: 3.2

## 2019-05-17 PROCEDURE — 99211 OFF/OP EST MAY X REQ PHY/QHP: CPT

## 2019-05-17 PROCEDURE — 85610 PROTHROMBIN TIME: CPT

## 2019-05-17 NOTE — PROGRESS NOTES
Fingerstick INR drawn per clinic protocol. Patient states no visible blood in urine and no black tarry stool. Denies any missed doses of warfarin. No change in other maintenance medications or in diet. Patient will hold warfarin today. Will continue current warfarin regimen and recheck INR in 3 week(s). Patient acknowledges working in consult agreement with pharmacist as referred by his/her physician.

## 2019-06-07 ENCOUNTER — HOSPITAL ENCOUNTER (OUTPATIENT)
Dept: PHARMACY | Age: 54
Setting detail: THERAPIES SERIES
Discharge: HOME | End: 2019-06-07
Payer: COMMERCIAL

## 2019-06-07 VITALS
WEIGHT: 176 LBS | HEART RATE: 72 BPM | DIASTOLIC BLOOD PRESSURE: 66 MMHG | SYSTOLIC BLOOD PRESSURE: 102 MMHG | BODY MASS INDEX: 27.57 KG/M2

## 2019-06-07 DIAGNOSIS — Z95.2 S/P AVR (AORTIC VALVE REPLACEMENT): ICD-10-CM

## 2019-06-07 DIAGNOSIS — Z79.01 LONG TERM CURRENT USE OF ANTICOAGULANT THERAPY: ICD-10-CM

## 2019-06-07 LAB — INR BLD: 3

## 2019-06-07 PROCEDURE — 99211 OFF/OP EST MAY X REQ PHY/QHP: CPT

## 2019-06-07 PROCEDURE — 85610 PROTHROMBIN TIME: CPT

## 2019-06-07 RX ORDER — UMECLIDINIUM BROMIDE AND VILANTEROL TRIFENATATE 62.5; 25 UG/1; UG/1
1 POWDER RESPIRATORY (INHALATION) DAILY
Refills: 5 | COMMUNITY
Start: 2019-05-16 | End: 2021-03-31 | Stop reason: ALTCHOICE

## 2019-06-07 RX ORDER — CITALOPRAM 40 MG/1
40 TABLET ORAL DAILY
COMMUNITY
Start: 2019-05-30

## 2019-06-07 NOTE — PATIENT INSTRUCTIONS
Continue to monitor urine and stool. Continue to monitor for signs of bleeding. Return to clinic in 5 weeks.

## 2019-07-12 ENCOUNTER — HOSPITAL ENCOUNTER (OUTPATIENT)
Dept: PHARMACY | Age: 54
Setting detail: THERAPIES SERIES
Discharge: HOME | End: 2019-07-12
Payer: COMMERCIAL

## 2019-07-12 VITALS
HEART RATE: 65 BPM | BODY MASS INDEX: 28.19 KG/M2 | DIASTOLIC BLOOD PRESSURE: 57 MMHG | SYSTOLIC BLOOD PRESSURE: 91 MMHG | WEIGHT: 180 LBS

## 2019-07-12 DIAGNOSIS — Z79.01 LONG TERM CURRENT USE OF ANTICOAGULANT THERAPY: ICD-10-CM

## 2019-07-12 DIAGNOSIS — Z95.2 S/P AVR (AORTIC VALVE REPLACEMENT): ICD-10-CM

## 2019-07-12 LAB — INR BLD: 4

## 2019-07-12 PROCEDURE — 85610 PROTHROMBIN TIME: CPT

## 2019-07-12 PROCEDURE — 99212 OFFICE O/P EST SF 10 MIN: CPT

## 2019-08-08 ENCOUNTER — HOSPITAL ENCOUNTER (OUTPATIENT)
Dept: PHARMACY | Age: 54
Setting detail: THERAPIES SERIES
Discharge: HOME | End: 2019-08-08
Payer: COMMERCIAL

## 2019-08-08 VITALS
HEART RATE: 66 BPM | WEIGHT: 180.6 LBS | BODY MASS INDEX: 28.29 KG/M2 | SYSTOLIC BLOOD PRESSURE: 98 MMHG | DIASTOLIC BLOOD PRESSURE: 61 MMHG

## 2019-08-08 DIAGNOSIS — Z95.2 S/P AVR (AORTIC VALVE REPLACEMENT): ICD-10-CM

## 2019-08-08 DIAGNOSIS — Z79.01 LONG TERM CURRENT USE OF ANTICOAGULANT THERAPY: ICD-10-CM

## 2019-08-08 LAB — INR BLD: 2.8

## 2019-08-08 PROCEDURE — 85610 PROTHROMBIN TIME: CPT

## 2019-08-08 PROCEDURE — 99211 OFF/OP EST MAY X REQ PHY/QHP: CPT

## 2019-08-15 ENCOUNTER — TELEPHONE (OUTPATIENT)
Dept: GASTROENTEROLOGY | Age: 54
End: 2019-08-15

## 2019-08-15 DIAGNOSIS — Z12.11 COLON CANCER SCREENING: Primary | ICD-10-CM

## 2019-08-15 RX ORDER — SODIUM, POTASSIUM,MAG SULFATES 17.5-3.13G
SOLUTION, RECONSTITUTED, ORAL ORAL
Qty: 2 BOTTLE | Refills: 0 | Status: ON HOLD | OUTPATIENT
Start: 2019-08-15 | End: 2019-09-04 | Stop reason: HOSPADM

## 2019-08-23 NOTE — TELEPHONE ENCOUNTER
Dr. Makenzie Arias at [facility] is her cardiologist.    I have created a letter and faxed up to [facility] to get an approval for the Coumadin Hold.     Dr. Makenzie Arias [phone]   Fax [phone]

## 2019-08-26 ENCOUNTER — HOSPITAL ENCOUNTER (OUTPATIENT)
Dept: BONE DENSITY | Age: 54
Discharge: HOME | End: 2019-08-28
Payer: COMMERCIAL

## 2019-08-26 DIAGNOSIS — Z78.0 POSTMENOPAUSAL: ICD-10-CM

## 2019-08-26 PROCEDURE — 77080 DXA BONE DENSITY AXIAL: CPT

## 2019-08-26 NOTE — TELEPHONE ENCOUNTER
Patient called to  See if we received the okay for her to schedule her procedure, I explained that we did not that when we do we will call back

## 2019-09-03 ENCOUNTER — ANTI-COAG VISIT (OUTPATIENT)
Dept: PHARMACY | Age: 54
End: 2019-09-03

## 2019-09-04 ENCOUNTER — HOSPITAL ENCOUNTER (OUTPATIENT)
Age: 54
Setting detail: OUTPATIENT SURGERY
Discharge: HOME | End: 2019-09-04
Attending: INTERNAL MEDICINE | Admitting: INTERNAL MEDICINE
Payer: COMMERCIAL

## 2019-09-04 ENCOUNTER — ANESTHESIA EVENT (OUTPATIENT)
Dept: OPERATING ROOM | Age: 54
End: 2019-09-04
Payer: COMMERCIAL

## 2019-09-04 ENCOUNTER — ANESTHESIA (OUTPATIENT)
Dept: OPERATING ROOM | Age: 54
End: 2019-09-04
Payer: COMMERCIAL

## 2019-09-04 VITALS
OXYGEN SATURATION: 100 % | DIASTOLIC BLOOD PRESSURE: 61 MMHG | SYSTOLIC BLOOD PRESSURE: 88 MMHG | RESPIRATION RATE: 18 BRPM

## 2019-09-04 VITALS
HEART RATE: 61 BPM | SYSTOLIC BLOOD PRESSURE: 113 MMHG | TEMPERATURE: 97.7 F | DIASTOLIC BLOOD PRESSURE: 63 MMHG | OXYGEN SATURATION: 100 % | WEIGHT: 180 LBS | HEIGHT: 67 IN | RESPIRATION RATE: 18 BRPM | BODY MASS INDEX: 28.25 KG/M2

## 2019-09-04 PROCEDURE — 6360000002 HC RX W HCPCS: Performed by: NURSE ANESTHETIST, CERTIFIED REGISTERED

## 2019-09-04 PROCEDURE — 7100000010 HC PHASE II RECOVERY - FIRST 15 MIN: Performed by: INTERNAL MEDICINE

## 2019-09-04 PROCEDURE — 3700000001 HC ADD 15 MINUTES (ANESTHESIA): Performed by: INTERNAL MEDICINE

## 2019-09-04 PROCEDURE — 7100000011 HC PHASE II RECOVERY - ADDTL 15 MIN: Performed by: INTERNAL MEDICINE

## 2019-09-04 PROCEDURE — 2709999900 HC NON-CHARGEABLE SUPPLY: Performed by: INTERNAL MEDICINE

## 2019-09-04 PROCEDURE — 3609027000 HC COLONOSCOPY: Performed by: INTERNAL MEDICINE

## 2019-09-04 PROCEDURE — 3700000000 HC ANESTHESIA ATTENDED CARE: Performed by: INTERNAL MEDICINE

## 2019-09-04 PROCEDURE — 2500000003 HC RX 250 WO HCPCS: Performed by: NURSE ANESTHETIST, CERTIFIED REGISTERED

## 2019-09-04 PROCEDURE — 45378 DIAGNOSTIC COLONOSCOPY: CPT | Performed by: INTERNAL MEDICINE

## 2019-09-04 PROCEDURE — 2580000003 HC RX 258: Performed by: INTERNAL MEDICINE

## 2019-09-04 RX ORDER — SODIUM CHLORIDE, SODIUM LACTATE, POTASSIUM CHLORIDE, CALCIUM CHLORIDE 600; 310; 30; 20 MG/100ML; MG/100ML; MG/100ML; MG/100ML
INJECTION, SOLUTION INTRAVENOUS CONTINUOUS
Status: DISCONTINUED | OUTPATIENT
Start: 2019-09-04 | End: 2019-09-04 | Stop reason: HOSPADM

## 2019-09-04 RX ORDER — LIDOCAINE HYDROCHLORIDE 20 MG/ML
INJECTION, SOLUTION EPIDURAL; INFILTRATION; INTRACAUDAL; PERINEURAL PRN
Status: DISCONTINUED | OUTPATIENT
Start: 2019-09-04 | End: 2019-09-04 | Stop reason: SDUPTHER

## 2019-09-04 RX ORDER — PROPOFOL 10 MG/ML
INJECTION, EMULSION INTRAVENOUS CONTINUOUS PRN
Status: DISCONTINUED | OUTPATIENT
Start: 2019-09-04 | End: 2019-09-04 | Stop reason: SDUPTHER

## 2019-09-04 RX ORDER — PROPOFOL 10 MG/ML
INJECTION, EMULSION INTRAVENOUS PRN
Status: DISCONTINUED | OUTPATIENT
Start: 2019-09-04 | End: 2019-09-04 | Stop reason: SDUPTHER

## 2019-09-04 RX ADMIN — PROPOFOL 60 MG: 10 INJECTION, EMULSION INTRAVENOUS at 11:03

## 2019-09-04 RX ADMIN — PROPOFOL 200 MCG/KG/MIN: 10 INJECTION, EMULSION INTRAVENOUS at 11:03

## 2019-09-04 RX ADMIN — SODIUM CHLORIDE, POTASSIUM CHLORIDE, SODIUM LACTATE AND CALCIUM CHLORIDE: 600; 310; 30; 20 INJECTION, SOLUTION INTRAVENOUS at 10:32

## 2019-09-04 RX ADMIN — LIDOCAINE HYDROCHLORIDE 50 MG: 20 INJECTION, SOLUTION EPIDURAL; INFILTRATION; INTRACAUDAL at 11:03

## 2019-09-04 RX ADMIN — PROPOFOL 40 MG: 10 INJECTION, EMULSION INTRAVENOUS at 11:10

## 2019-09-04 ASSESSMENT — PAIN SCALES - GENERAL
PAINLEVEL_OUTOF10: 0

## 2019-09-04 ASSESSMENT — COPD QUESTIONNAIRES: CAT_SEVERITY: MODERATE

## 2019-09-04 ASSESSMENT — LIFESTYLE VARIABLES: SMOKING_STATUS: 0

## 2019-09-04 ASSESSMENT — ENCOUNTER SYMPTOMS: DYSPNEA ACTIVITY LEVEL: AFTER AMBULATING 2 FLIGHTS OF STAIRS

## 2019-09-04 NOTE — H&P
History and Physical    Patient's Name/Date of Birth: Clarissa Hull / 1965 (73 y.o.)    Date: 2019     CHIEF COMPLAINT:  screening for colon cancer      Past Medical History:   Diagnosis Date    Aortic valve stenosis     CAD (coronary artery disease)     Cancer (HCC)     breast    [facility]     COPD [facility]chronic obstructive pulmonary disease[facility]     Hx of blood clots     Hypertension     Lupus [facility]     Seizures (HCC)      Past Surgical History:   Procedure Laterality Date    BREAST SURGERY Right     mastectomy    CARDIAC SURGERY      AVR    TUBAL LIGATION       Current Facility-Administered Medications   Medication Dose Route Frequency Provider Last Rate Last Dose    lactated ringers infusion   Intravenous Continuous Moody Oliver  mL/hr at 19 1032       Allergies   Allergen Reactions    Coreg [Carvedilol] Anaphylaxis    Spiriva Handihaler [Tiotropium Bromide Monohydrate] Other (See Comments)     Nose bleeds     History reviewed. No pertinent family history.   Social History     Socioeconomic History    Marital status:      Spouse name: Not on file    Number of children: Not on file    Years of education: Not on file    Highest education level: Not on file   Occupational History    Not on file   Social Needs    Financial resource strain: Not on file    Food insecurity:     Worry: Not on file     Inability: Not on file    Transportation needs:     Medical: Not on file     Non-medical: Not on file   Tobacco Use    Smoking status: Former Smoker     Packs/day: 1.50     Last attempt to quit: 2018     Years since quittin.3    Smokeless tobacco: Never Used   Substance and Sexual Activity    Alcohol use: No    Drug use: No    Sexual activity: Not on file   Lifestyle    Physical activity:     Days per week: Not on file     Minutes per session: Not on file    Stress: Not on file   Relationships   

## 2019-09-04 NOTE — PROGRESS NOTES
Discharge Criteria    Inpatients must meet Criteria 1 through 7. All other patients are either YES or N/A. If a NO is chosen then Anesthesia or Surgeon must be notified. 1.  Minimum 30 minutes after last dose of sedative medication, minimum 120 minutes after last dose of reversal agent. Yes      2. Systolic BP stable within 20 mmHg for 30 minutes & systolic BP between 90 & 259 or within 10 mmHg of baseline. Yes      3. Pulse between 60 and 100 or within 10 bpm of baseline. Yes      4. Spontaneous respiratory rate >/= 10 per minute. Yes      5. SaO2 >/= 95 or  >/= baseline. Yes      6. Able to cough and swallow or return to baseline function. Yes      7. Alert and oriented or return to baseline mental status. Yes      8. Demonstrates controlled, coordinated movements, ambulates with steady gait, or return to baseline activity function. Yes      9. Minimal or no pain or nausea, or at a level tolerable and acceptable to patient. Yes      10. Takes and retains oral fluids as allowed. Yes      11. Procedural / perioperative site stable. Minimal or no bleeding. Yes          12. If GI endoscopy procedure, minimal or no abdominal distention or passing flatus. Yes      13. Written discharge instructions and emergency telephone number provided. Yes      14. Accompanied by a responsible adult. Yes      Adult patient discharged from facility without responsible person meets above criteria plus the following:   a) remains awake without stimulus for 30 minutes     b) oriented appropriate for age      c) all vital signs stable   d) no significant risk of losing protective reflexes      e) able to maintain pre-procedure mobility without assistance   f) no nausea or dizziness      g) transportation arrangements that do not require patient to operate motor Vehicle.      N/A

## 2019-09-06 ENCOUNTER — HOSPITAL ENCOUNTER (OUTPATIENT)
Dept: PHARMACY | Age: 54
Setting detail: THERAPIES SERIES
Discharge: HOME | End: 2019-09-06
Payer: COMMERCIAL

## 2019-09-06 VITALS
WEIGHT: 180 LBS | BODY MASS INDEX: 28.19 KG/M2 | SYSTOLIC BLOOD PRESSURE: 122 MMHG | HEART RATE: 76 BPM | DIASTOLIC BLOOD PRESSURE: 66 MMHG

## 2019-09-06 DIAGNOSIS — Z95.2 S/P AVR (AORTIC VALVE REPLACEMENT): ICD-10-CM

## 2019-09-06 DIAGNOSIS — Z79.01 LONG TERM CURRENT USE OF ANTICOAGULANT THERAPY: ICD-10-CM

## 2019-09-06 LAB — INR BLD: 1.5

## 2019-09-06 PROCEDURE — 85610 PROTHROMBIN TIME: CPT

## 2019-09-06 PROCEDURE — 99212 OFFICE O/P EST SF 10 MIN: CPT

## 2019-09-19 ENCOUNTER — APPOINTMENT (OUTPATIENT)
Dept: PHARMACY | Age: 54
End: 2019-09-19
Payer: COMMERCIAL

## 2019-09-29 PROBLEM — Z12.11 COLON CANCER SCREENING: Status: RESOLVED | Noted: 2017-08-03 | Resolved: 2019-09-29

## 2019-10-02 ENCOUNTER — HOSPITAL ENCOUNTER (OUTPATIENT)
Dept: PHARMACY | Age: 54
Setting detail: THERAPIES SERIES
Discharge: HOME | End: 2019-10-02
Payer: COMMERCIAL

## 2019-10-02 VITALS
DIASTOLIC BLOOD PRESSURE: 65 MMHG | SYSTOLIC BLOOD PRESSURE: 109 MMHG | BODY MASS INDEX: 28.51 KG/M2 | WEIGHT: 182 LBS | HEART RATE: 66 BPM

## 2019-10-02 DIAGNOSIS — Z79.01 LONG TERM CURRENT USE OF ANTICOAGULANT THERAPY: ICD-10-CM

## 2019-10-02 DIAGNOSIS — Z95.2 S/P AVR (AORTIC VALVE REPLACEMENT): ICD-10-CM

## 2019-10-02 LAB — INR BLD: 3.2

## 2019-10-02 PROCEDURE — 85610 PROTHROMBIN TIME: CPT

## 2019-10-02 PROCEDURE — 99212 OFFICE O/P EST SF 10 MIN: CPT

## 2019-10-29 ENCOUNTER — HOSPITAL ENCOUNTER (OUTPATIENT)
Dept: PHARMACY | Age: 54
Setting detail: THERAPIES SERIES
Discharge: HOME | End: 2019-10-29
Payer: COMMERCIAL

## 2019-10-29 VITALS — WEIGHT: 183 LBS | BODY MASS INDEX: 28.66 KG/M2

## 2019-10-29 DIAGNOSIS — Z79.01 LONG TERM CURRENT USE OF ANTICOAGULANT THERAPY: ICD-10-CM

## 2019-10-29 DIAGNOSIS — Z95.2 S/P AVR (AORTIC VALVE REPLACEMENT): ICD-10-CM

## 2019-10-29 LAB — INR BLD: 3.1

## 2019-10-29 PROCEDURE — 85610 PROTHROMBIN TIME: CPT | Performed by: FAMILY MEDICINE

## 2019-10-29 PROCEDURE — 99211 OFF/OP EST MAY X REQ PHY/QHP: CPT | Performed by: FAMILY MEDICINE

## 2019-12-18 ENCOUNTER — OFFICE VISIT (OUTPATIENT)
Dept: CARDIOLOGY | Age: 54
End: 2019-12-18
Payer: COMMERCIAL

## 2019-12-18 ENCOUNTER — HOSPITAL ENCOUNTER (OUTPATIENT)
Dept: PHARMACY | Age: 54
Setting detail: THERAPIES SERIES
Discharge: HOME | End: 2019-12-18
Payer: COMMERCIAL

## 2019-12-18 ENCOUNTER — HOSPITAL ENCOUNTER (OUTPATIENT)
Dept: NON INVASIVE DIAGNOSTICS | Age: 54
Discharge: HOME | End: 2019-12-18
Payer: COMMERCIAL

## 2019-12-18 VITALS
WEIGHT: 183 LBS | SYSTOLIC BLOOD PRESSURE: 94 MMHG | DIASTOLIC BLOOD PRESSURE: 60 MMHG | BODY MASS INDEX: 28.66 KG/M2 | HEART RATE: 79 BPM

## 2019-12-18 VITALS
OXYGEN SATURATION: 98 % | DIASTOLIC BLOOD PRESSURE: 77 MMHG | HEIGHT: 67 IN | SYSTOLIC BLOOD PRESSURE: 119 MMHG | WEIGHT: 182.8 LBS | RESPIRATION RATE: 18 BRPM | BODY MASS INDEX: 28.69 KG/M2 | HEART RATE: 77 BPM

## 2019-12-18 DIAGNOSIS — Z95.2 S/P AVR (AORTIC VALVE REPLACEMENT): Primary | ICD-10-CM

## 2019-12-18 DIAGNOSIS — R94.31 ABNORMAL EKG: ICD-10-CM

## 2019-12-18 DIAGNOSIS — Z95.2 S/P AVR (AORTIC VALVE REPLACEMENT): ICD-10-CM

## 2019-12-18 DIAGNOSIS — E78.5 DYSLIPIDEMIA: ICD-10-CM

## 2019-12-18 DIAGNOSIS — R94.31 ABNORMAL ECG: ICD-10-CM

## 2019-12-18 DIAGNOSIS — Z86.73 HISTORY OF STROKE: ICD-10-CM

## 2019-12-18 DIAGNOSIS — Z79.01 CHRONIC ANTICOAGULATION: ICD-10-CM

## 2019-12-18 DIAGNOSIS — Z79.01 LONG TERM CURRENT USE OF ANTICOAGULANT THERAPY: ICD-10-CM

## 2019-12-18 LAB
INR BLD: 3.3
LV EF: 55 %
LVEF MODALITY: NORMAL

## 2019-12-18 PROCEDURE — C8929 TTE W OR WO FOL WCON,DOPPLER: HCPCS

## 2019-12-18 PROCEDURE — G8419 CALC BMI OUT NRM PARAM NOF/U: HCPCS | Performed by: INTERNAL MEDICINE

## 2019-12-18 PROCEDURE — 6360000004 HC RX CONTRAST MEDICATION: Performed by: FAMILY MEDICINE

## 2019-12-18 PROCEDURE — 1036F TOBACCO NON-USER: CPT | Performed by: INTERNAL MEDICINE

## 2019-12-18 PROCEDURE — 85610 PROTHROMBIN TIME: CPT

## 2019-12-18 PROCEDURE — 93306 TTE W/DOPPLER COMPLETE: CPT

## 2019-12-18 PROCEDURE — 3017F COLORECTAL CA SCREEN DOC REV: CPT | Performed by: INTERNAL MEDICINE

## 2019-12-18 PROCEDURE — 93000 ELECTROCARDIOGRAM COMPLETE: CPT | Performed by: INTERNAL MEDICINE

## 2019-12-18 PROCEDURE — 99212 OFFICE O/P EST SF 10 MIN: CPT

## 2019-12-18 PROCEDURE — 99214 OFFICE O/P EST MOD 30 MIN: CPT | Performed by: INTERNAL MEDICINE

## 2019-12-18 PROCEDURE — G8484 FLU IMMUNIZE NO ADMIN: HCPCS | Performed by: INTERNAL MEDICINE

## 2019-12-18 PROCEDURE — G8427 DOCREV CUR MEDS BY ELIG CLIN: HCPCS | Performed by: INTERNAL MEDICINE

## 2019-12-18 RX ADMIN — PERFLUTREN 2.2 MG: 6.52 INJECTION, SUSPENSION INTRAVENOUS at 11:53

## 2020-01-07 ENCOUNTER — ANTI-COAG VISIT (OUTPATIENT)
Dept: PHARMACY | Age: 55
End: 2020-01-07

## 2020-01-15 ENCOUNTER — HOSPITAL ENCOUNTER (OUTPATIENT)
Dept: PHARMACY | Age: 55
Setting detail: THERAPIES SERIES
Discharge: HOME | End: 2020-01-15
Payer: COMMERCIAL

## 2020-01-15 VITALS
DIASTOLIC BLOOD PRESSURE: 53 MMHG | SYSTOLIC BLOOD PRESSURE: 84 MMHG | BODY MASS INDEX: 28.35 KG/M2 | HEART RATE: 77 BPM | WEIGHT: 181 LBS

## 2020-01-15 LAB — INR BLD: 3.6

## 2020-01-15 PROCEDURE — 85610 PROTHROMBIN TIME: CPT

## 2020-01-15 PROCEDURE — 99212 OFFICE O/P EST SF 10 MIN: CPT

## 2020-01-15 NOTE — PROGRESS NOTES
Patient states no visible blood in urine and no black tarry stool. No change in other medications. Will return to clinic in 2 weeks. Patient finished a course of Augmentin yesterday. Patient had a little diarrhea. Patient will hold warfarin today then decrease dosage to warfarin 2 tablets for 5 mg on M,W,F and whole 2.5 mg tablet all other days.

## 2020-01-15 NOTE — PATIENT INSTRUCTIONS
Continue to monitor urine and stool. Continue to monitor for signs of bleeding. Return to clinic in 2 weeks. Hold warfarin today then decrease dosage to warfarin 2 tablets for 5 mg on M,W,F and whole 2.5 mg tablet all other days.

## 2020-01-29 ENCOUNTER — HOSPITAL ENCOUNTER (OUTPATIENT)
Dept: PHARMACY | Age: 55
Setting detail: THERAPIES SERIES
Discharge: HOME | End: 2020-01-29
Payer: COMMERCIAL

## 2020-01-29 VITALS
HEART RATE: 79 BPM | BODY MASS INDEX: 28.19 KG/M2 | SYSTOLIC BLOOD PRESSURE: 112 MMHG | WEIGHT: 180 LBS | DIASTOLIC BLOOD PRESSURE: 68 MMHG

## 2020-01-29 LAB — INR BLD: 3.2

## 2020-01-29 PROCEDURE — 99212 OFFICE O/P EST SF 10 MIN: CPT

## 2020-01-29 PROCEDURE — 85610 PROTHROMBIN TIME: CPT

## 2020-01-29 RX ORDER — HYDROXYCHLOROQUINE SULFATE 200 MG/1
400 TABLET, FILM COATED ORAL DAILY
COMMUNITY
Start: 2020-01-23

## 2020-01-29 RX ORDER — SULFAMETHOXAZOLE AND TRIMETHOPRIM 800; 160 MG/1; MG/1
1 TABLET ORAL 2 TIMES DAILY
COMMUNITY
Start: 2020-01-27 | End: 2020-01-30

## 2020-01-29 NOTE — PROGRESS NOTES
Patient states no visible blood in urine and no black tarry stool. No change in other medications. Will return to clinic in 2 weeks. Patient started a 3 day course of Bactrim yesterday. Patient will hold warfarin today then only take a  2.5 mg tablet this Friday (1-31-20) then continue warfarin 2 tablets for 5 mg on M,W,F and whole 2.5 mg tablet all other days.

## 2020-02-11 ENCOUNTER — TELEPHONE (OUTPATIENT)
Dept: PHARMACY | Age: 55
End: 2020-02-11

## 2020-02-12 ENCOUNTER — HOSPITAL ENCOUNTER (OUTPATIENT)
Dept: PHARMACY | Age: 55
Setting detail: THERAPIES SERIES
Discharge: HOME | End: 2020-02-12
Payer: COMMERCIAL

## 2020-02-12 VITALS
BODY MASS INDEX: 28.35 KG/M2 | WEIGHT: 181 LBS | HEART RATE: 63 BPM | SYSTOLIC BLOOD PRESSURE: 126 MMHG | DIASTOLIC BLOOD PRESSURE: 67 MMHG

## 2020-02-12 LAB — INR BLD: 1.7

## 2020-02-12 PROCEDURE — 99212 OFFICE O/P EST SF 10 MIN: CPT

## 2020-02-12 PROCEDURE — 85610 PROTHROMBIN TIME: CPT

## 2020-02-12 NOTE — PATIENT INSTRUCTIONS
Continue to monitor urine and stool. Continue to monitor for signs of bleeding. Return to clinic in 4 weeks. Take 1.5 tablets tomorrow for 3.75 mg then continue warfarin 2 tablets for 5 mg on M,W,F and whole 2.5 mg tablet all other days.

## 2020-03-11 ENCOUNTER — HOSPITAL ENCOUNTER (OUTPATIENT)
Dept: PHARMACY | Age: 55
Setting detail: THERAPIES SERIES
Discharge: HOME | End: 2020-03-11
Payer: COMMERCIAL

## 2020-03-11 VITALS
DIASTOLIC BLOOD PRESSURE: 56 MMHG | SYSTOLIC BLOOD PRESSURE: 101 MMHG | WEIGHT: 182 LBS | BODY MASS INDEX: 28.51 KG/M2 | HEART RATE: 69 BPM

## 2020-03-11 LAB — INR BLD: 2.2

## 2020-03-11 PROCEDURE — 85610 PROTHROMBIN TIME: CPT

## 2020-03-11 PROCEDURE — 99211 OFF/OP EST MAY X REQ PHY/QHP: CPT

## 2020-03-11 NOTE — PATIENT INSTRUCTIONS
Continue to monitor urine and stool. Continue to monitor for signs of bleeding. Return to clinic in 6 weeks. Continue warfarin 2 tablets for 5 mg on M,W,F and whole 2.5 mg tablet all other days.

## 2020-03-11 NOTE — PROGRESS NOTES
Patient states no visible blood in urine and no black tarry stool. No change in other medications. Will return to clinic in 6 weeks. Patient will continue warfarin 2 tablets for 5 mg on M,W,F and whole 2.5 mg tablet all other days.       CLINICAL PHARMACY CONSULT: MED RECONCILIATION/REVIEW ADDENDUM    For Pharmacy Admin Tracking Only    PHSO: No  Total # of Interventions Recommended: 0    - Maintenance Safety Lab Monitoring #: 1    Total Interventions Accepted: 0  Time Spent (min): 30    Arianna NicholasD

## 2020-05-05 ENCOUNTER — TELEPHONE (OUTPATIENT)
Dept: PHARMACY | Age: 55
End: 2020-05-05

## 2020-05-06 ENCOUNTER — HOSPITAL ENCOUNTER (OUTPATIENT)
Dept: PHARMACY | Age: 55
Setting detail: THERAPIES SERIES
Discharge: HOME | End: 2020-05-06
Payer: COMMERCIAL

## 2020-05-06 VITALS — TEMPERATURE: 99.6 F

## 2020-05-06 LAB — INR BLD: 1.7

## 2020-05-06 PROCEDURE — 99211 OFF/OP EST MAY X REQ PHY/QHP: CPT

## 2020-05-06 PROCEDURE — 85610 PROTHROMBIN TIME: CPT

## 2020-05-06 RX ORDER — ALENDRONATE SODIUM 70 MG/1
70 TABLET ORAL
COMMUNITY

## 2020-05-06 NOTE — PATIENT INSTRUCTIONS
Continue to monitor urine and stool. Continue to monitor for signs of bleeding. Return to clinic in 4 weeks. Take warfarin 7.25 mg today then continue warfarin 2 tablets for 5 mg on M,W,F and whole 2.5 mg tablet all other days.

## 2020-05-06 NOTE — PROGRESS NOTES
Patient states no visible blood in urine and no black tarry stool. No change in other medications. Patient started Fosamax. I added this medication to her list in Epic. Will return to clinic in 4 weeks. Patient will take warfarin 7.25 mg today then continue warfarin 2 tablets for 5 mg on M,W,F and whole 2.5 mg tablet all other days.      CLINICAL PHARMACY CONSULT: MED RECONCILIATION/REVIEW ADDENDUM    For Pharmacy Admin Tracking Only    PHSO: No  Total # of Interventions Recommended: 2  - New Order #: 1 New Medication Order Reason(s): Needs Additional Medication Therapy  - Maintenance Safety Lab Monitoring #: 1  Recommended intervention potential cost savings:   Accepted intervention potential cost savings:    Total Interventions Accepted: 2  Time Spent (min): 30    Prudy Hammans, AriannaD

## 2020-06-02 ENCOUNTER — TELEPHONE (OUTPATIENT)
Dept: PHARMACY | Age: 55
End: 2020-06-02

## 2020-06-03 ENCOUNTER — HOSPITAL ENCOUNTER (OUTPATIENT)
Dept: PHARMACY | Age: 55
Setting detail: THERAPIES SERIES
Discharge: HOME | End: 2020-06-03
Payer: COMMERCIAL

## 2020-06-03 VITALS — TEMPERATURE: 98.1 F

## 2020-06-03 LAB — INR BLD: 2.6

## 2020-06-03 PROCEDURE — 85610 PROTHROMBIN TIME: CPT | Performed by: FAMILY MEDICINE

## 2020-06-03 PROCEDURE — 99211 OFF/OP EST MAY X REQ PHY/QHP: CPT | Performed by: FAMILY MEDICINE

## 2020-06-03 NOTE — PROGRESS NOTES
Fingerstick INR drawn per clinic protocol. Patient states no visible blood in urine and no black tarry stool. Denies any missed doses of warfarin. No change in other maintenance medications or in diet. Will continue current warfarin regimen of 5mg MWF, 2.5mg all other days and recheck INR in 6 week(s). Patient acknowledges working in consult agreement with pharmacist as referred by his/her physician. CURBSIDE VISIT    CLINICAL PHARMACY CONSULT: MED RECONCILIATION/REVIEW ADDENDUM    For Pharmacy Admin Tracking Only    PHSO: No  Total # of Interventions Recommended: 1  - Updated Order #: 1 Updated Order Reason(s):  Other  - Maintenance Safety Lab Monitoring #: 1    Total Interventions Accepted: 2  Time Spent (min): [address]

## 2020-07-15 ENCOUNTER — HOSPITAL ENCOUNTER (OUTPATIENT)
Dept: PHARMACY | Age: 55
Setting detail: THERAPIES SERIES
Discharge: HOME | End: 2020-07-15
Payer: COMMERCIAL

## 2020-07-15 VITALS — TEMPERATURE: 98 F

## 2020-07-15 LAB — INR BLD: 2.7

## 2020-07-15 PROCEDURE — 85610 PROTHROMBIN TIME: CPT | Performed by: FAMILY MEDICINE

## 2020-07-15 PROCEDURE — 99211 OFF/OP EST MAY X REQ PHY/QHP: CPT | Performed by: FAMILY MEDICINE

## 2020-07-15 NOTE — PROGRESS NOTES
Fingerstick INR drawn per clinic protocol. Patient states no visible blood in urine and no black tarry stool. Denies any missed doses of warfarin. No change in other maintenance medications or in diet. Will continue current warfarin regimen and recheck INR in 6 week(s). Left message for patient yesterday for covid/travel screening. Patient denies cough/fever/SOB. Patient acknowledges working in consult agreement with pharmacist as referred by his/her physician. CURBSIDE VISIT    CLINICAL PHARMACY CONSULT: MED RECONCILIATION/REVIEW ADDENDUM    For Pharmacy Admin Tracking Only    PHSO: No  Total # of Interventions Recommended: 1  - Updated Order #: 1 Updated Order Reason(s):  Other  - Maintenance Safety Lab Monitoring #: 1    Total Interventions Accepted: 2  Time Spent (min): [address] [facility], [address] [place] [address]

## 2020-08-25 ENCOUNTER — TELEPHONE (OUTPATIENT)
Dept: PHARMACY | Age: 55
End: 2020-08-25

## 2020-08-26 ENCOUNTER — HOSPITAL ENCOUNTER (OUTPATIENT)
Dept: PHARMACY | Age: 55
Setting detail: THERAPIES SERIES
Discharge: HOME | End: 2020-08-26
Payer: COMMERCIAL

## 2020-08-26 VITALS — TEMPERATURE: 98.4 F

## 2020-08-26 LAB — INR BLD: 2.4

## 2020-08-26 PROCEDURE — 85610 PROTHROMBIN TIME: CPT

## 2020-08-26 PROCEDURE — 99211 OFF/OP EST MAY X REQ PHY/QHP: CPT

## 2020-08-26 NOTE — PROGRESS NOTES
Patient states no visible blood in urine and no black tarry stool.  No change in other medications. Will return to clinic in [place] will continue warfarin 2 tablets for 5 mg on M,W,F and whole 2.5 mg tablet all other days.  Saw patient IRIS. CLINICAL PHARMACY CONSULT: MED RECONCILIATION/REVIEW ADDENDUM    For Pharmacy Admin Tracking Only    PHSO: No  Total # of Interventions Recommended: 0    - Maintenance Safety Lab Monitoring #: 1  Recommended intervention potential cost savings:   Accepted intervention potential cost savings:    Total Interventions Accepted: 1  Time Spent (min): [address] [facility], [address] [place] [address]

## 2020-10-06 ENCOUNTER — TELEPHONE (OUTPATIENT)
Dept: PHARMACY | Age: 55
End: 2020-10-06

## 2020-10-06 NOTE — TELEPHONE ENCOUNTER
Recent Travel Screening and Travel History documentation:     Travel Screening     Question   Response    In the last month, have you been in contact with someone who was confirmed or suspected to have Coronavirus / COVID-19? Unable to assess    Have you had a COVID-19 viral test in the last 14 days? Unable to assess    Do you have any of the following new or worsening symptoms? Unable to assess    Have you traveled internationally in the last month? Unable to assess      Travel History   Travel since 09/06/20     No documented travel since 09/06/20         [facility] or [facility] symptoms and informed that it will be a [facility] visit at the main hospital entrance and to stay in the car.     Nikki Grider, PharmD 10/6/2020 9:05 AM

## 2020-10-07 ENCOUNTER — HOSPITAL ENCOUNTER (OUTPATIENT)
Dept: PHARMACY | Age: 55
Setting detail: THERAPIES SERIES
Discharge: HOME | End: 2020-10-07
Payer: COMMERCIAL

## 2020-10-07 VITALS — TEMPERATURE: 98.9 F

## 2020-10-07 LAB — INR BLD: 3.8

## 2020-10-07 PROCEDURE — 85610 PROTHROMBIN TIME: CPT

## 2020-10-07 PROCEDURE — 99212 OFFICE O/P EST SF 10 MIN: CPT

## 2020-10-07 RX ORDER — ARIPIPRAZOLE 10 MG/1
10 TABLET ORAL DAILY
COMMUNITY
Start: 2020-08-14

## 2020-10-07 NOTE — PATIENT INSTRUCTIONS
Continue to monitor urine and stool. Continue to monitor for signs of bleeding. Return to clinic in 2 weeks. Patient will hold warfarin today then continue warfarin 2 tablets for 5 mg on M,W,F and whole 2.5 mg tablet all other days.

## 2020-10-20 ENCOUNTER — TELEPHONE (OUTPATIENT)
Dept: PHARMACY | Age: 55
End: 2020-10-20

## 2020-10-20 NOTE — TELEPHONE ENCOUNTER
Recent Travel Screening and Travel History documentation:     Travel Screening     Question   Response    In the last month, have you been in contact with someone who was confirmed or suspected to have Coronavirus / COVID-19? Unable to assess    Have you had a COVID-19 viral test in the last 14 days? Unable to assess    Do you have any of the following new or worsening symptoms? Unable to assess    Have you traveled internationally in the last month? Unable to assess      Travel History   Travel since 09/20/20     No documented travel since 09/20/20         Left a voicemail message that visit is inside at office and to call if fever or respiratory symptoms.      Shalom Owens, PharmD 10/20/2020 10:15 AM

## 2020-10-21 ENCOUNTER — HOSPITAL ENCOUNTER (OUTPATIENT)
Dept: PHARMACY | Age: 55
Setting detail: THERAPIES SERIES
Discharge: HOME | End: 2020-10-21
Payer: COMMERCIAL

## 2020-10-21 VITALS
TEMPERATURE: 97.5 F | DIASTOLIC BLOOD PRESSURE: 58 MMHG | BODY MASS INDEX: 28.98 KG/M2 | HEART RATE: 73 BPM | SYSTOLIC BLOOD PRESSURE: 121 MMHG | WEIGHT: 185 LBS

## 2020-10-21 LAB — INR BLD: 3.1

## 2020-10-21 PROCEDURE — 85610 PROTHROMBIN TIME: CPT

## 2020-10-21 PROCEDURE — 99211 OFF/OP EST MAY X REQ PHY/QHP: CPT

## 2020-10-21 NOTE — PATIENT INSTRUCTIONS
Continue to monitor urine and stool. Continue to monitor for signs of bleeding. Return to clinic in 3 weeks. Decrease warfarin to 2 tablets for 5mg on M,F and whole 2.5 mg tablet all other days.

## 2020-10-21 NOTE — PROGRESS NOTES
[facility]/[place]  Medication Management  ANTICOAGULATION    Referring Doctor: Harlan Mcintyre INR: 2-3    TODAY'S INR: 3.1    WARFARIN Dosage: Decrease warfarin to 2 tablets for 5mg on M,F and whole 2.5 mg tablet all other days. INR (no units)   Date Value   10/21/2020 3.1   10/07/2020 3.8   08/26/2020 2.4   07/15/2020 2.7   06/03/2020 2.6   05/06/2020 1.7   03/11/2020 2.2       Hemoglobin (g/dL)   Date Value   07/31/2017 12.1   06/29/2014 12.3   09/20/2013 11.6 (L)     Hematocrit (%)   Date Value   07/31/2017 36.3   06/29/2014 36.2   09/20/2013 35.0 (L)     Platelets (k/uL)   Date Value   07/31/2017 122 (L)   06/29/2014 135 (L)   09/20/2013 155     Notes:    Fingerstick INR drawn per clinic protocol. Patient states no visible blood in urine and no black tarry stool. Denies any missed doses of warfarin. Patient stopped Topamax. No change  in diet. Will recheck INR in 3 weeks. Patient acknowledges working in consult agreement with pharmacist as referred by his/her physician. CLINICAL PHARMACY CONSULT: MED RECONCILIATION/REVIEW ADDENDUM    For Pharmacy Admin Tracking Only    PHSO: No  Total # of Interventions Recommended: 2  - Decreased Dose #: 1  - Discontinued Medication #: 1 Discontinue Reason(s): No Longer Used  - Maintenance Safety Lab Monitoring #: 1  Recommended intervention potential cost savings:   Accepted intervention potential cost savings:    Total Interventions Accepted: 3  Time Spent (min): 30    Yasemin Sanchez, AriannaD

## 2020-11-10 ENCOUNTER — TELEPHONE (OUTPATIENT)
Dept: PHARMACY | Age: 55
End: 2020-11-10

## 2020-11-10 NOTE — TELEPHONE ENCOUNTER
Recent Travel Screening and Travel History documentation:     Travel Screening     Question   Response    In the last month, have you been in contact with someone who was confirmed or suspected to have Coronavirus / COVID-19? Unable to assess    Have you had a COVID-19 viral test in the last 14 days? Unable to assess    Do you have any of the following new or worsening symptoms? Unable to assess    Have you traveled internationally in the last month? Unable to assess      Travel History   Travel since 10/10/20     No documented travel since 10/10/20         Left message for patient and provided instruction for  INR check/appiontment. Instructed patient to notify clinic  if fever or s/s respiratory illness. Told to come inside to clinic for apt.

## 2020-11-11 ENCOUNTER — HOSPITAL ENCOUNTER (OUTPATIENT)
Dept: PHARMACY | Age: 55
Setting detail: THERAPIES SERIES
Discharge: HOME | End: 2020-11-11
Payer: COMMERCIAL

## 2020-11-11 VITALS
DIASTOLIC BLOOD PRESSURE: 82 MMHG | WEIGHT: 187 LBS | HEART RATE: 75 BPM | TEMPERATURE: 97.6 F | BODY MASS INDEX: 29.29 KG/M2 | SYSTOLIC BLOOD PRESSURE: 100 MMHG

## 2020-11-11 LAB — INR BLD: 1.9

## 2020-11-11 PROCEDURE — 99211 OFF/OP EST MAY X REQ PHY/QHP: CPT

## 2020-11-11 PROCEDURE — 85610 PROTHROMBIN TIME: CPT

## 2020-11-11 NOTE — PATIENT INSTRUCTIONS
Continue to monitor urine and stool. Continue to monitor for signs of bleeding. Return to clinic in 5 weeks. Increase warfarin to whole 2.5 mg tablet on M,F and 1.5 tablets for 3.75 mg all other days.

## 2020-11-11 NOTE — PROGRESS NOTES
Asha [facility]/[place]  Medication Management  ANTICOAGULATION    Referring Doctor: Erik Favre INR: 2-3    TODAY'S INR: 1.9    WARFARIN Dosage: Increase warfarin to whole 2.5 mg tablet on M,F and 1.5 tablets for 3.75 mg all other days. INR (no units)   Date Value   11/11/2020 1.9   10/21/2020 3.1   10/07/2020 3.8   08/26/2020 2.4   07/15/2020 2.7   06/03/2020 2.6   05/06/2020 1.7       Hemoglobin (g/dL)   Date Value   07/31/2017 12.1   06/29/2014 12.3   09/20/2013 11.6 (L)     Hematocrit (%)   Date Value   07/31/2017 36.3   06/29/2014 36.2   09/20/2013 35.0 (L)     Platelets (k/uL)   Date Value   07/31/2017 122 (L)   06/29/2014 135 (L)   09/20/2013 155      Notes:    Fingerstick INR drawn per clinic protocol. Patient states no visible blood in urine and no black tarry stool. Denies any missed doses of warfarin. No change in other maintenance medications or in diet. Will recheck INR in 5 weeks. Patient acknowledges working in consult agreement with pharmacist as referred by his/her physician. CLINICAL PHARMACY CONSULT: MED RECONCILIATION/REVIEW ADDENDUM    For Pharmacy Admin Tracking Only    PHSO: No  Total # of Interventions Recommended: 4  - Increased Dose #: 4  - Maintenance Safety Lab Monitoring #: 1  Recommended intervention potential cost savings:   Accepted intervention potential cost savings:    Total Interventions Accepted: 5  Time Spent (min): 30    Erwin Ceballos, AriannaD

## 2020-12-17 ENCOUNTER — HOSPITAL ENCOUNTER (OUTPATIENT)
Dept: PHARMACY | Age: 55
Setting detail: THERAPIES SERIES
Discharge: HOME | End: 2020-12-17
Payer: COMMERCIAL

## 2020-12-17 VITALS
WEIGHT: 188.98 LBS | HEART RATE: 83 BPM | SYSTOLIC BLOOD PRESSURE: 137 MMHG | BODY MASS INDEX: 29.6 KG/M2 | DIASTOLIC BLOOD PRESSURE: 78 MMHG

## 2020-12-17 LAB — INR BLD: 2.7

## 2020-12-17 PROCEDURE — 99211 OFF/OP EST MAY X REQ PHY/QHP: CPT | Performed by: FAMILY MEDICINE

## 2020-12-17 PROCEDURE — 85610 PROTHROMBIN TIME: CPT | Performed by: FAMILY MEDICINE

## 2020-12-17 NOTE — PROGRESS NOTES
Asha [facility]/[place]  Medication Management  ANTICOAGULATION    Referring Doctor: Kranthi Hearn INR: 2.0-3.0    TODAY'S INR: 2.7    WARFARIN Dosage: 2.5mg MF, 2.75mg all other days    INR (no units)   Date Value   12/17/2020 2.7   11/11/2020 1.9   10/21/2020 3.1   10/07/2020 3.8   08/26/2020 2.4   07/15/2020 2.7   06/03/2020 2.6       Notes:    Fingerstick INR drawn per clinic protocol. Patient states no visible blood in urine and no black tarry stool. Denies any missed doses of warfarin. No change in other maintenance medications or in diet. Will recheck INR in 5 weeks. Patient acknowledges working in consult agreement with pharmacist as referred by his/her physician. CLINICAL PHARMACY CONSULT: MED RECONCILIATION/REVIEW ADDENDUM    For Pharmacy Admin Tracking Only    PHSO: No  Total # of Interventions Recommended: 1  - Updated Order #: 1 Updated Order Reason(s):  Other  - Maintenance Safety Lab Monitoring #: 1    Total Interventions Accepted: 2  Time Spent (min): 81 [employer] [facility]

## 2020-12-18 ENCOUNTER — HOSPITAL ENCOUNTER (OUTPATIENT)
Age: 55
Discharge: HOME | End: 2020-12-18
Payer: COMMERCIAL

## 2020-12-18 ENCOUNTER — HOSPITAL ENCOUNTER (OUTPATIENT)
Age: 55
Discharge: HOME | End: 2020-12-20
Payer: COMMERCIAL

## 2020-12-18 ENCOUNTER — HOSPITAL ENCOUNTER (OUTPATIENT)
Dept: GENERAL RADIOLOGY | Age: 55
Discharge: HOME | End: 2020-12-20
Payer: COMMERCIAL

## 2020-12-18 ENCOUNTER — OFFICE VISIT (OUTPATIENT)
Dept: CARDIOLOGY | Age: 55
End: 2020-12-18
Payer: COMMERCIAL

## 2020-12-18 VITALS
BODY MASS INDEX: 29.54 KG/M2 | DIASTOLIC BLOOD PRESSURE: 75 MMHG | SYSTOLIC BLOOD PRESSURE: 113 MMHG | RESPIRATION RATE: 18 BRPM | OXYGEN SATURATION: 98 % | HEART RATE: 81 BPM | WEIGHT: 188.2 LBS | HEIGHT: 67 IN

## 2020-12-18 LAB
ANION GAP SERPL CALCULATED.3IONS-SCNC: 7 MMOL/L (ref 9–17)
BNP INTERPRETATION: ABNORMAL
BUN BLDV-MCNC: 9 MG/DL (ref 6–20)
BUN/CREAT BLD: 8 (ref 9–20)
CALCIUM SERPL-MCNC: 9.4 MG/DL (ref 8.6–10.4)
CHLORIDE BLD-SCNC: 106 MMOL/L (ref 98–107)
CHOLESTEROL/HDL RATIO: 2
CHOLESTEROL: 134 MG/DL
CO2: 24 MMOL/L (ref 20–31)
CREAT SERPL-MCNC: 1.07 MG/DL (ref 0.5–0.9)
GFR AFRICAN AMERICAN: >60 ML/MIN
GFR NON-AFRICAN AMERICAN: 53 ML/MIN
GFR SERPL CREATININE-BSD FRML MDRD: ABNORMAL ML/MIN/{1.73_M2}
GFR SERPL CREATININE-BSD FRML MDRD: ABNORMAL ML/MIN/{1.73_M2}
GLUCOSE BLD-MCNC: 91 MG/DL (ref 70–99)
HCT VFR BLD CALC: 40.3 % (ref 36.3–47.1)
HDLC SERPL-MCNC: 68 MG/DL
HEMOGLOBIN: 12.2 G/DL (ref 11.9–15.1)
LDL CHOLESTEROL: 47 MG/DL (ref 0–130)
MCH RBC QN AUTO: 27.4 PG (ref 25.2–33.5)
MCHC RBC AUTO-ENTMCNC: 30.3 G/DL (ref 28.4–34.8)
MCV RBC AUTO: 90.6 FL (ref 82.6–102.9)
NRBC AUTOMATED: 0 PER 100 WBC
PDW BLD-RTO: 14.6 % (ref 11.8–14.4)
PLATELET # BLD: ABNORMAL K/UL (ref 138–453)
PLATELET, FLUORESCENCE: 129 K/UL (ref 138–453)
PLATELET, IMMATURE FRACTION: 6 % (ref 1.1–10.3)
PMV BLD AUTO: ABNORMAL FL (ref 8.1–13.5)
POTASSIUM SERPL-SCNC: 4.4 MMOL/L (ref 3.7–5.3)
PRO-BNP: 384 PG/ML
RBC # BLD: 4.45 M/UL (ref 3.95–5.11)
SODIUM BLD-SCNC: 137 MMOL/L (ref 135–144)
TRIGL SERPL-MCNC: 93 MG/DL
VLDLC SERPL CALC-MCNC: NORMAL MG/DL (ref 1–30)
WBC # BLD: 4.5 K/UL (ref 3.5–11.3)

## 2020-12-18 PROCEDURE — 36415 COLL VENOUS BLD VENIPUNCTURE: CPT

## 2020-12-18 PROCEDURE — 80061 LIPID PANEL: CPT

## 2020-12-18 PROCEDURE — 93005 ELECTROCARDIOGRAM TRACING: CPT

## 2020-12-18 PROCEDURE — 93000 ELECTROCARDIOGRAM COMPLETE: CPT | Performed by: INTERNAL MEDICINE

## 2020-12-18 PROCEDURE — 71046 X-RAY EXAM CHEST 2 VIEWS: CPT

## 2020-12-18 PROCEDURE — G8419 CALC BMI OUT NRM PARAM NOF/U: HCPCS | Performed by: INTERNAL MEDICINE

## 2020-12-18 PROCEDURE — G8484 FLU IMMUNIZE NO ADMIN: HCPCS | Performed by: INTERNAL MEDICINE

## 2020-12-18 PROCEDURE — 80048 BASIC METABOLIC PNL TOTAL CA: CPT

## 2020-12-18 PROCEDURE — 85055 RETICULATED PLATELET ASSAY: CPT

## 2020-12-18 PROCEDURE — 99214 OFFICE O/P EST MOD 30 MIN: CPT | Performed by: INTERNAL MEDICINE

## 2020-12-18 PROCEDURE — 83880 ASSAY OF NATRIURETIC PEPTIDE: CPT

## 2020-12-18 PROCEDURE — 1036F TOBACCO NON-USER: CPT | Performed by: INTERNAL MEDICINE

## 2020-12-18 PROCEDURE — G8427 DOCREV CUR MEDS BY ELIG CLIN: HCPCS | Performed by: INTERNAL MEDICINE

## 2020-12-18 PROCEDURE — 85027 COMPLETE CBC AUTOMATED: CPT

## 2020-12-18 PROCEDURE — 3017F COLORECTAL CA SCREEN DOC REV: CPT | Performed by: INTERNAL MEDICINE

## 2020-12-18 NOTE — PATIENT INSTRUCTIONS
SURVEY:    You may be receiving a survey from [employer] regarding your visit today. Please complete the survey to enable us to provide the highest quality of care to you and your family. If you cannot score us a very good on any question, please call the office to discuss how we could have made your experience a very good one. Thank you.

## 2020-12-18 NOTE — PROGRESS NOTES
Karthikeyan De am scribing for and in the presence of Stephany Torres MD, F.A.C.C..    Patient: Get Paredes  : 1965  Date of Visit: 2020    REASON FOR VISIT / CONSULTATION: 1 Year Follow Up (S/P AVR,Abn EKG,Chr. Anticoag,Stroke,HLD. Echo on 19. He has been doing pretty good. Some SOB. Some Lightheaded/dizziness with doing a lot of lifting and walking a lot. Denies: CP, Palpitations. )      Dear Mayte Santos had the pleasure of seeing your patient Get Paredes in consultation today. Ms. Sarah Pope is a 54 y.o. female with extensive medical history that includes. 1-History of systemic lupus and antiphospholipid antibody syndrome diagnosed at age 27. 2-History of stroke secondary to hypercoagulable state treated for many years with warfarin, switched to Xarelto (used it for several years until 2018). 3-History of myocardial infarction in , most likely embolic. She has several cardiac catheterizations her most recent one was prior to her aortic valve surgery in 2018, no significant CAD. 4-Bicuspid aortic valve with severe aortic stenosis, S/P aortic valve replacement with mechanical valve done at [facility] clinic on 2018. 5-Long history of smoking and COPD. Currently with chronic hypoxemic respiratory failure on home oxygen therapy. 6-ECG done (2019) in office: Normal Sinus rhythm. ST and T waves abnormalities in inferolateral leads. Grossly unchanged from before. 7-Echocardiogram done on 2019 showed an EF of 55%. LV wall thickness is mildly increased. 8- EKG done today in office on 2020 showed with rhythm, ST segments depression and T wave abnormalities in the lateral leads are grossly unchanged from prior. Exercise Tolerance: She reports having a a poor exercise tolerance. Ms. Sarah Pope says that she her functional capacity is limited by the shortness of breath. Ms. Sarah Pope is here for yearly follow up.  She is doing well, other than having trouble breathing due to COPD that seems to be getting worse now for her. She also has some lightheaded/dizziness on and off. She uses a vape every now and then. She doesn't sleep well at night, tosses and turns about 6 different times throughout the night. Her  tells her that she snores. She was seen at [facility] clinic and had an echo on 2020. Denied any current or recent chest pain, pressure, or tightness. She denies any palpitations. No abdominal pain, nausea or vomiting. No bleeding problems, bowel issues, problems with her medications or any other concerns at this time. She never hears her mechanical click. Past Medical History:   Diagnosis Date    Aortic valve stenosis     CAD (coronary artery disease)     Cancer (HCC)     breast    Cerebral artery occlusion with cerebral infarction (HCC)     COPD (chronic obstructive pulmonary disease) (HCC)     Hx of blood clots     Hypertension     Lupus ([address]     Seizures (HCC)        CURRENT ALLERGIES: Coreg [carvedilol] and Spiriva handihaler [tiotropium bromide monohydrate] REVIEW OF SYSTEMS: 14 systems were reviewed. Pertinent positives and negatives as above, all else negative. Past Surgical History:   Procedure Laterality Date    BREAST SURGERY Right     mastectomy    CARDIAC SURGERY      AVR    COLONOSCOPY  2019    -hemorrhoids    COLONOSCOPY N/A 2019    COLORECTAL CANCER SCREENING, NOT HIGH RISK performed by Jer Vergara MD at [address]      Social History:  Social History     Tobacco Use    Smoking status: Former Smoker     Packs/day: 1.50     Quit date: 2018     Years since quittin.6    Smokeless tobacco: Never Used   Substance Use Topics    Alcohol use: No    Drug use: No        Family history includes; father  at age 61. Maternal aunt with breast cancer/hysterectomy. Maternal uncle with lymphoma on diet shortly after diagnosis.     CURRENT MEDICATIONS:  Outpatient Medications Marked as Taking for the 12/18/20 encounter (Office Visit) with Prakash Whitehead MD   Medication Sig Dispense Refill    ARIPiprazole (ABILIFY) 10 MG tablet Take 10 mg by mouth daily      alendronate (FOSAMAX) 70 MG tablet Take 70 mg by mouth every 7 days      hydroxychloroquine (PLAQUENIL) 200 MG tablet Take 200 mg by mouth daily      Calcium-Magnesium-Vitamin D (CITRACAL CALCIUM+D PO) Take 500 mg by mouth daily      ANORO ELLIPTA 62.5-25 MCG/INH AEPB inhaler Inhale 1 puff into the lungs daily  5    citalopram (CELEXA) 40 MG tablet Take 40 mg by mouth daily      nitroGLYCERIN (NITROSTAT) 0.4 MG SL tablet Place 0.4 mg under the tongue as needed      metoprolol succinate (TOPROL XL) 25 MG extended release tablet Take 25 mg by mouth 2 times daily 25mg in AM, 12.5mg in PM      warfarin (COUMADIN) 2.5 MG tablet Take 2.5 mg by mouth See Admin Instructions 5 mg M,F and 2.5 mg all other days -Coumadin clinic      albuterol sulfate  (90 Base) MCG/ACT inhaler Inhale 2 puffs into the lungs every 6 hours as needed      rosuvastatin (CRESTOR) 20 MG tablet Take 20 mg by mouth nightly      ALPRAZolam (XANAX) 0.5 MG tablet Take 0.5 mg by mouth daily as needed. [place] zonisamide (ZONEGRAN) 100 MG capsule Take 200 mg by mouth daily       acetaminophen (TYLENOL) 325 MG tablet Take 650 mg by mouth every 4 hours as needed      omeprazole (PRILOSEC) 20 MG delayed release capsule Take 20 mg by mouth daily      aspirin 81 MG EC tablet Take 81 mg by mouth daily. Physical Examination:     /75 (Site: Left Upper Arm, Position: Sitting, Cuff Size: Large Adult)   Pulse 81   Resp 18   Ht 5' 7\" (1.702 m)   Wt 188 lb 3.2 oz (85.4 kg)   SpO2 98%   BMI 29.48 kg/m²  Body mass index is 29.48 kg/m². Constitutional: She appeared oriented to person and place. She appears well-developed and well-nourished. In no acute distress. HEENT: Normocephalic and atraumatic.  No JVD present. Carotid bruit is not present. No mass and no thyromegaly present. No lymphadenopathy noted. Cardiovascular: Normal rate, regular rhythm, normal S1, mechanical S2. 1/6 systolic murmur, 2nd intercostal space on the LEFT just lateral to the sternum  Pulmonary/Chest: Poor air entry bilaterally with diffuse wheezing. No significant crackles. Abdominal: Soft, non-tender. She exhibits no organomegaly, mass or bruit. Extremities: No significant edema. No cyanosis or clubbing. 2+ radial and carotid pulses. Distal extremity pulses: 2+ bilaterally. Neurological: Alertness and orientation as per Constitutional exam. No evidence of gross cranial nerve deficit. Coordination appeared normal.   Skin: Skin is warm and dry. There is no rash or diaphoresis. Psychiatric: She has a normal mood and affect. Her speech is normal and behavior is normal.          MOST RECENT LABS ON RECORD:   Lab Results   Component Value Date    WBC 5.0 07/31/2017    HGB 12.1 07/31/2017    HCT 36.3 07/31/2017     (L) 07/31/2017    CHOL 205 (H) 07/31/2017    TRIG 190 (H) 07/31/2017    HDL 48 07/31/2017    ALT 8 07/31/2017    AST 16 07/31/2017     07/31/2017    K 4.3 07/31/2017     07/31/2017    CREATININE 1.00 (H) 07/31/2017    BUN 8 07/31/2017    CO2 21 07/31/2017    TSH 1.12 05/20/2013    INR 2.7 12/17/2020    LABA1C 4.9 07/31/2017    LABMICR 27 (H) 07/31/2017         ASSESSMENT:  1. S/P AVR (aortic valve replacement)    2. Severe aortic stenosis    3. ASHD (arteriosclerotic heart disease)    4. History of stroke    5. Abnormal ECG    6. Chronic anticoagulation    7. Dyslipidemia       PLAN:  History of severe Aortic Stenosis: S/P Aortic Vavle Replacment done 4/24/2020 to 5/2/2020 at [facility] [employer] [facility] clinic  · Beta Blocker: Continue metoprolol succinate (Toprol XL) 25 mg twice daily. Anticoagulation: Continue Warfarin. Goal INR 2-3.  I also reminded her to watch for signs of blood in her stool or black tarry stools and stop the medication immediately if this develops as this could be life threatening. Continue aspirin as well. Testing: Will check CBC, basic metabolic panel, BNP and lipid profile. Chest XRAY ordered to assess her shortness of breath. Reviewed her most recent echo from [facility] clinic. Ejection fraction is good. Aortic valve prosthesis is functioning normally. Nonobstructive CAD and history of stroke  Antiplatelet Agent: Continue aspirin 81 mg daily. I also reminded her to watch for signs of blood in her stool or black tarry stools and stop the medication immediately if this develops as this could be life threatening. Beta Blocker: Continue metoprolol succinate (Toprol XL)      Statin Therapy: Continue rosuvastatin (Crestor) 20 mg nightly. I discussed the potential benefits of statin therapy as well as the potential risks including myalgia as well as the rare but potentially serious complication of liver or kidney damage. Although rare, I told her that this could be serious and therefore told her to stop the medication immediately and call if she developed any severe muscle aches or pains and she agreed to do so. Follow-up repeat lipid panel. · Abnormal EKG:  · Significant ST segment changes but grossly unchanged from prior. · Continue aspirin, Toprol-XL and Lipitor. · History of normal coronary arteries back in 2018. In the meantime I asked her to continue taking her other medications and follow up with you as previously scheduled. FOLLOW UP:   I told Ms. Condon to call my office if she had any problems, but otherwise told her to No follow-ups on file. However, I would be happy to see her sooner should the need arise. Sincerely,  Malissa Winslow MD, F.A.C.C.   [facility] Cardiology Specialist     [facility]  [facility], [address] [place] [address]  Phone: [phone], Fax: [phone]     I believe that the risk of significant morbidity and mortality related to the patient's current medical conditions are: intermediate-high. The documentation recorded by the scribe, accurately and completely reflects the services I personally performed and the decisions made by me. Ramo Sanchez MD, F.A.C.C.  December 18, 2020

## 2020-12-21 ENCOUNTER — TELEPHONE (OUTPATIENT)
Dept: CARDIOLOGY | Age: 55
End: 2020-12-21

## 2020-12-21 NOTE — TELEPHONE ENCOUNTER
----- Message from Nixon Andersen MD sent at 12/18/2020 10:13 PM EST -----  Blood work and chest x-ray are okay. Continue current therapy and follow-up. Please call with questions and/or concerns.   Thank you

## 2021-01-01 NOTE — PATIENT INSTRUCTIONS
Please hold warfarin today. Continue current dose of warfarin as instructed on dosing calendar provided. Continue to monitor urine and stool for signs and symptoms of bleeding. Please notify the clinic of any medication changes. Please remember to bring all medications (both prescription and OTC) to your next visit. Kindly notify the clinic if you are unable to make to your next appointment. (1) body pink, extremities blue

## 2021-01-13 ENCOUNTER — HOSPITAL ENCOUNTER (OUTPATIENT)
Age: 56
Discharge: HOME | End: 2021-01-15
Payer: MEDICARE

## 2021-01-13 ENCOUNTER — HOSPITAL ENCOUNTER (OUTPATIENT)
Dept: GENERAL RADIOLOGY | Age: 56
Discharge: HOME | End: 2021-01-15
Payer: MEDICARE

## 2021-01-13 DIAGNOSIS — M25.562 LEFT KNEE PAIN, UNSPECIFIED CHRONICITY: ICD-10-CM

## 2021-01-13 PROCEDURE — 73562 X-RAY EXAM OF KNEE 3: CPT

## 2021-01-19 ENCOUNTER — TELEPHONE (OUTPATIENT)
Dept: PHARMACY | Age: 56
End: 2021-01-19

## 2021-01-19 NOTE — TELEPHONE ENCOUNTER
Robert Parsons contacted clinic stating she needs to reschedule INR check for tomorrow due to Covid + result on Sunday 1/17/21. Patient states she is eating well and getting plenty of fluids. Has significant fatigue. Has had a small amount of diarrhea to date. Discussed possible need to adjust dosing if diarrhea becomes increased/significant. Requested call to clinic if needed. Patient states she is having increased SOB and is using inhalers more frequently. We discussed monitoring closely and going to ER if breathing becomes too labored. Rescheduled INR check to Tuesday 2/2/21.

## 2021-02-01 ENCOUNTER — TELEPHONE (OUTPATIENT)
Dept: PHARMACY | Age: 56
End: 2021-02-01

## 2021-02-02 ENCOUNTER — HOSPITAL ENCOUNTER (OUTPATIENT)
Dept: PHARMACY | Age: 56
Setting detail: THERAPIES SERIES
Discharge: HOME | End: 2021-02-02
Payer: COMMERCIAL

## 2021-02-02 VITALS
WEIGHT: 183 LBS | DIASTOLIC BLOOD PRESSURE: 62 MMHG | BODY MASS INDEX: 28.66 KG/M2 | SYSTOLIC BLOOD PRESSURE: 87 MMHG | HEART RATE: 76 BPM

## 2021-02-02 DIAGNOSIS — Z95.2 S/P AVR (AORTIC VALVE REPLACEMENT): ICD-10-CM

## 2021-02-02 DIAGNOSIS — Z79.01 LONG TERM CURRENT USE OF ANTICOAGULANT THERAPY: ICD-10-CM

## 2021-02-02 LAB — INR BLD: 4.2

## 2021-02-02 PROCEDURE — 99212 OFFICE O/P EST SF 10 MIN: CPT | Performed by: FAMILY MEDICINE

## 2021-02-02 PROCEDURE — 85610 PROTHROMBIN TIME: CPT | Performed by: FAMILY MEDICINE

## 2021-02-02 NOTE — PROGRESS NOTES
Asha [address] [facility]/[place]  Medication Management  ANTICOAGULATION    Referring Doctor: Dr Susana Steiner INR: 2.0-3.0    TODAY'S INR: 4.2    WARFARIN Dosage: hold x1, 1.25mg x1 then resume 2.5mg MF, 3.75mg all other days    INR (no units)   Date Value   02/02/2021 4.2   12/17/2020 2.7   11/11/2020 1.9   10/21/2020 3.1   10/07/2020 3.8   08/26/2020 2.4   07/15/2020 2.7        Changes:  Patient tested positive for Covid on 1/17/21. Patient had minor loss of taste and smell which has returned. Patient had significant fatigue which is improving. Patient's appetite was poor and lost about 5 lbs. Appetite has returned. Patient has COPD and routinely has SOB; this remains \"slightly more than normal\" at this time. Patient will hold warfarin today and take 1/2 tablet tomorrow then resume previous dosing. Patient has a routine yearly appt for COPD and arthritis next week at the [facility]. Notes:    Fingerstick INR drawn per clinic protocol. Patient states no visible blood in urine and no black tarry stool. Denies any missed doses of warfarin. No change in other maintenance medications or in diet. Will recheck INR in 2 weeks. Patient acknowledges working in consult agreement with pharmacist as referred by his/her physician.                   CLINICAL PHARMACY CONSULT: MED RECONCILIATION/REVIEW ADDENDUM    For Pharmacy Admin Tracking Only    PHSO: No  Total # of Interventions Recommended: 2  - Decreased Dose #: 2  - Maintenance Safety Lab Monitoring #: 1    Total Interventions Accepted: 3  Time Spent (min): 81 [employer] [place]

## 2021-02-02 NOTE — PATIENT INSTRUCTIONS
Do not take warfarin today. Please take 1/2 tablet (1.25mg) warfarin tomorrow (Wednesday) then return to your regular dosing. Continue to monitor for signs of bleeding. Return to coumadin clinic in 2 weeks.

## 2021-02-16 ENCOUNTER — TELEPHONE (OUTPATIENT)
Dept: PHARMACY | Age: 56
End: 2021-02-16

## 2021-02-17 ENCOUNTER — HOSPITAL ENCOUNTER (OUTPATIENT)
Dept: PHARMACY | Age: 56
Setting detail: THERAPIES SERIES
Discharge: HOME | End: 2021-02-17
Payer: COMMERCIAL

## 2021-02-17 VITALS
DIASTOLIC BLOOD PRESSURE: 69 MMHG | SYSTOLIC BLOOD PRESSURE: 127 MMHG | BODY MASS INDEX: 30.1 KG/M2 | WEIGHT: 192.2 LBS | HEART RATE: 81 BPM

## 2021-02-17 DIAGNOSIS — Z95.2 S/P AVR (AORTIC VALVE REPLACEMENT): ICD-10-CM

## 2021-02-17 DIAGNOSIS — Z79.01 LONG TERM CURRENT USE OF ANTICOAGULANT THERAPY: ICD-10-CM

## 2021-02-17 LAB — INR BLD: 2.2

## 2021-02-17 PROCEDURE — 85610 PROTHROMBIN TIME: CPT

## 2021-02-17 PROCEDURE — 99211 OFF/OP EST MAY X REQ PHY/QHP: CPT

## 2021-02-17 NOTE — PROGRESS NOTES
Asha [facility]/[place]  Medication Management  ANTICOAGULATION    Referring Doctor: Daphnie Nooyla INR: 2-3    TODAY'S INR: 2.2    WARFARIN Dosage: Patient will continue warfarin 2.5 mg every Monday and Friday and 3.75 mg all other days. INR (no units)   Date Value   02/17/2021 2.2   02/02/2021 4.2   12/17/2020 2.7   11/11/2020 1.9   10/21/2020 3.1   10/07/2020 3.8   08/26/2020 2.4       Notes:    Fingerstick INR drawn per clinic protocol. Patient states no visible blood in urine and no black tarry stool. Denies any missed doses of warfarin. No change in other maintenance medications or in diet. Will recheck INR in 6 weeks. Patient acknowledges working in consult agreement with pharmacist as referred by his/her physician. CLINICAL PHARMACY CONSULT: MED RECONCILIATION/REVIEW ADDENDUM    For Pharmacy Admin Tracking Only    PHSO: No  Total # of Interventions Recommended: 1  - Updated Order #: 1 Updated Order Reason(s): Other  - Maintenance Safety Lab Monitoring #: 1  Total Interventions Accepted: 2  Time Spent (min): [address]

## 2021-02-17 NOTE — PATIENT INSTRUCTIONS
Continue current dose of warfarin as instructed on dosing calendar provided - 1 tablet (=2.5 mg) every Monday and Friday and 1.5 tablets (=3.75 mg) all other days. Return to clinic in 6 weeks. Continue to monitor urine and stool for signs and symptoms of bleeding.

## 2021-03-24 ENCOUNTER — ANTI-COAG VISIT (OUTPATIENT)
Dept: PHARMACY | Age: 56
End: 2021-03-24

## 2021-03-24 VITALS — WEIGHT: 192 LBS | HEIGHT: 67 IN | BODY MASS INDEX: 30.13 KG/M2

## 2021-03-24 DIAGNOSIS — Z95.2 S/P AVR (AORTIC VALVE REPLACEMENT): ICD-10-CM

## 2021-03-24 DIAGNOSIS — Z79.01 LONG TERM CURRENT USE OF ANTICOAGULANT THERAPY: ICD-10-CM

## 2021-03-24 NOTE — PROGRESS NOTES
Asha 72 [facility]/[place]  Medication Management  ANTICOAGULATION BRIDGE        Referring Doctor: Braeden Mari    Procedure: Bronchoscopy     Procedure date: April 13    On warfarin for OnX aortic valve and Antiphospholipid Antibiodies. Ms. Supriya Lay is a 54 y.o. female    Wt Readings from Last 1 Encounters:   03/24/21 192 lb (87.1 kg)      Ht Readings from Last 1 Encounters:   03/24/21 5' 7\" (1.702 m)    here for instructions regarding warfarin hold for procedure and Lovenox bridge    Ideal body weight: 61.6 kg (135 lb 12.9 oz)  Adjusted ideal body weight: 71.8 kg (158 lb 4.5 oz)      CREATININE (mg/dL)   Date Value   12/18/2020 1.07 (H)   07/31/2017 1.00 (H)   06/29/2014 0.88      Estimated Creatinine Clearance: 67 mL/min (A) (based on SCr of 1.07 mg/dL (H)). INR (no units)   Date Value   02/17/2021 2.2   02/02/2021 4.2   12/17/2020 2.7   11/11/2020 1.9       Hemoglobin (g/dL)   Date Value   12/18/2020 12.2   07/31/2017 12.1   06/29/2014 12.3     Hematocrit (%)   Date Value   12/18/2020 40.3   07/31/2017 36.3   06/29/2014 36.2     Platelets (k/uL)   Date Value   12/18/2020 See Reflexed IPF Result   07/31/2017 122 (L)   06/29/2014 135 (L)       Lovenox Bridge Protocol  [id number] High Risk (1.5mg/kg daily)      Patient to hold warfarin 5 days prior to surgery. Last warfarin dose on 4-7-21. Start Lovenox 1.5mg/kg daily (wt 87 kg) 36 hours after warfarin discontinuation on 4-9-21 in am.  `  Stop Lovenox 24 hours before your procedure. Last Lovenox dose on  4-12-21  am.    Restart warfarin the evening of your procedure on  4-13-21. Restart Lovenox 24 hours after your procedure on 4-14-21 am/pm.    Return to warfarin clinic for INR drawn on 4-20-21. Lovenox 120 mg subcutaneously every 24 hours # 10 was called to [facility] voicemail  on (date) 3-24-21. Thank you!   Silvano Sommer, PharmD 3/24/2021 12:41 PM

## 2021-03-30 ENCOUNTER — TELEPHONE (OUTPATIENT)
Dept: PHARMACY | Age: 56
End: 2021-03-30

## 2021-03-30 NOTE — TELEPHONE ENCOUNTER
COVID-19 phone screening     Call placed to screen patient prior to upcoming Medication Management visit for Anticoagulation on 3/31/21. Does patient have any of the following symptoms? [] Fever    [] Lower respiratory symptoms (SOB, difficulty breathing, cough)  [x] None    Travel Screening completed.    FABIAN Espinal.Ph., 3/30/2021,10:26 AM

## 2021-03-31 ENCOUNTER — HOSPITAL ENCOUNTER (OUTPATIENT)
Dept: PHARMACY | Age: 56
Setting detail: THERAPIES SERIES
Discharge: HOME | End: 2021-03-31
Payer: COMMERCIAL

## 2021-03-31 VITALS
DIASTOLIC BLOOD PRESSURE: 63 MMHG | HEART RATE: 68 BPM | WEIGHT: 195.6 LBS | BODY MASS INDEX: 30.64 KG/M2 | SYSTOLIC BLOOD PRESSURE: 114 MMHG

## 2021-03-31 DIAGNOSIS — Z79.01 LONG TERM CURRENT USE OF ANTICOAGULANT THERAPY: ICD-10-CM

## 2021-03-31 DIAGNOSIS — Z95.2 S/P AVR (AORTIC VALVE REPLACEMENT): ICD-10-CM

## 2021-03-31 LAB — INR BLD: 1.7

## 2021-03-31 PROCEDURE — 85610 PROTHROMBIN TIME: CPT | Performed by: FAMILY MEDICINE

## 2021-03-31 PROCEDURE — 99212 OFFICE O/P EST SF 10 MIN: CPT | Performed by: FAMILY MEDICINE

## 2021-03-31 RX ORDER — FLUTICASONE FUROATE, UMECLIDINIUM BROMIDE AND VILANTEROL TRIFENATATE 200; 62.5; 25 UG/1; UG/1; UG/1
1 POWDER RESPIRATORY (INHALATION) DAILY
COMMUNITY
Start: 2021-03-11 | End: 2021-07-15 | Stop reason: ALTCHOICE

## 2021-03-31 NOTE — PATIENT INSTRUCTIONS
Please take 2 tablets (5mg) today then return to your regular dosing on 1 tablet (2.5mg) on Mondays and Fridays and 1 1/2 tablets (3.75mg) all other days. Then last dose of warfarin before bronchoscopy will be April 7. Please follow calendar provided for warfarin and lovenox dosing.

## 2021-03-31 NOTE — PROGRESS NOTES
Asha [address] [place] [address]/[place]  Medication Management  ANTICOAGULATION    Referring Doctor: Dr Maribell Espinal INR: 2.0-3.0    TODAY'S INR: 1.7    WARFARIN Dosage: 5mg warfarin today then resume 2.5mg MF, 3.75mg all other days    INR (no units)   Date Value   03/31/2021 1.7   02/17/2021 2.2   02/02/2021 4.2   12/17/2020 2.7   11/11/2020 1.9   10/21/2020 3.1   10/07/2020 3.8       Medication changes:  Hydroxyurea increased to 400mg po daily   Anoro Ellipta changed to Ellipta Trilegy  Notes:    Fingerstick INR drawn per clinic protocol. Patient states no visible blood in urine and no black tarry stool. Denies any missed doses of warfarin. No change in other maintenance medications or in diet. Will recheck INR in 3 weeks. Patient is having bronchoscopy 4/13 and will hold warfarin 5 days prior to procedure, bridging with therapeutic lovenox pre and post procedure. Calendar of instructions provided to patient. Patient acknowledges working in consult agreement with pharmacist as referred by his/her physician.                   CLINICAL PHARMACY CONSULT: MED RECONCILIATION/REVIEW ADDENDUM    For Pharmacy Admin Tracking Only    PHSO: No  Total # of Interventions Recommended: 2  -increase dose 1  -correct medication list  -provided instruction for lovenox bridge  - Maintenance Safety Lab Monitoring #: 1  Total Interventions Accepted: 3  Time Spent (min): 81 [employer] [facility] Kuo

## 2021-04-19 ENCOUNTER — TELEPHONE (OUTPATIENT)
Dept: PHARMACY | Age: 56
End: 2021-04-19

## 2021-04-20 ENCOUNTER — HOSPITAL ENCOUNTER (OUTPATIENT)
Dept: PHARMACY | Age: 56
Setting detail: THERAPIES SERIES
Discharge: HOME | End: 2021-04-20
Payer: COMMERCIAL

## 2021-04-20 VITALS
BODY MASS INDEX: 30.76 KG/M2 | WEIGHT: 196.4 LBS | HEART RATE: 71 BPM | SYSTOLIC BLOOD PRESSURE: 111 MMHG | DIASTOLIC BLOOD PRESSURE: 67 MMHG

## 2021-04-20 DIAGNOSIS — Z95.2 S/P AVR (AORTIC VALVE REPLACEMENT): ICD-10-CM

## 2021-04-20 DIAGNOSIS — Z79.01 LONG TERM CURRENT USE OF ANTICOAGULANT THERAPY: ICD-10-CM

## 2021-04-20 LAB — INR BLD: 1.4

## 2021-04-20 PROCEDURE — 85610 PROTHROMBIN TIME: CPT | Performed by: FAMILY MEDICINE

## 2021-04-20 PROCEDURE — 99212 OFFICE O/P EST SF 10 MIN: CPT | Performed by: FAMILY MEDICINE

## 2021-04-20 NOTE — PROGRESS NOTES
Asha [address] [facility]/[place]  Medication Management  ANTICOAGULATION    Referring Doctor: Dr Eugene Carballo INR: 2.0-3.0    TODAY'S INR: 1.4    WARFARIN Dosage: 5mg x3 then resume 2.5mg MF, 3.75mg all other days    INR (no units)   Date Value   04/20/2021 1.4   03/31/2021 1.7   02/17/2021 2.2   02/02/2021 4.2   12/17/2020 2.7   11/11/2020 1.9   10/21/2020 3.1       Notes:    Fingerstick INR drawn per clinic protocol. Patient states no visible blood in urine and no black tarry stool. Denies any missed doses of warfarin. No change in other maintenance medications or in diet. Will recheck INR in 1 week. 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 Patient states she was prepared for this diagnosis and is awaiting insurance approval for PET scan, which will be in about 2 weeks. Patient has follow up from procedure on Monday 4/26 at [facility]. Patient states she was told her lungs are not healthy enough to have surgery so she will likely have radiation. Patient was having some post-procedure clots and was instructed to hold last dose of lovenox. Due to lovenox hold, we will only increase warfarin dosing rather than continue lovenox despite subtherapeutic INR at this time. Patient to take 5mg warfarin for 3 doses then resume previous dosing. Patient states she is having bloodwork on Monday which may include INR. She will contact clinic if she has an INR reading at that time. Patient acknowledges working in consult agreement with pharmacist as referred by his/her physician.                   CLINICAL PHARMACY CONSULT: MED RECONCILIATION/REVIEW ADDENDUM    For Pharmacy Admin Tracking Only    PHSO: No  Total # of Interventions Recommended: 3  - Increased Dose #: 3  - Maintenance Safety Lab Monitoring #: 1  Total Interventions Accepted: 4  Time Spent (min): [address] Tiki Scott

## 2021-04-27 ENCOUNTER — TELEPHONE (OUTPATIENT)
Dept: PHARMACY | Age: 56
End: 2021-04-27

## 2021-04-27 NOTE — TELEPHONE ENCOUNTER
Recent Travel Screening and Travel History documentation:     Travel Screening     Question   Response    In the last month, have you been in contact with someone who was confirmed or suspected to have Coronavirus / COVID-19? Unable to assess    Have you had a COVID-19 viral test in the last 14 days? Unable to assess    Do you have any of the following new or worsening symptoms? Unable to assess    Have you traveled internationally or domestically in the last month? Unable to assess      Travel History   Travel since 03/27/21     No documented travel since 03/27/21         Had to leave [facility].     Daphney Castillo, PharmD 4/27/2021 12:00 PM

## 2021-04-28 ENCOUNTER — HOSPITAL ENCOUNTER (OUTPATIENT)
Dept: PHARMACY | Age: 56
Setting detail: THERAPIES SERIES
Discharge: HOME | End: 2021-04-28
Payer: COMMERCIAL

## 2021-04-28 DIAGNOSIS — Z79.01 LONG TERM CURRENT USE OF ANTICOAGULANT THERAPY: ICD-10-CM

## 2021-04-28 DIAGNOSIS — Z95.2 S/P AVR (AORTIC VALVE REPLACEMENT): ICD-10-CM

## 2021-04-28 LAB — INR BLD: 1.7

## 2021-04-28 PROCEDURE — 85610 PROTHROMBIN TIME: CPT

## 2021-04-28 PROCEDURE — 99212 OFFICE O/P EST SF 10 MIN: CPT

## 2021-04-28 NOTE — PATIENT INSTRUCTIONS
Please take warfarin 5 mg today and tomorrow. Increase current dose of warfarin as instructed on dosing calendar provided - 2 tablets (=5 mg) every Monday, Wednesday, Friday and 1 tablet (=2.5 mg) all other days. Return to clinic in 10 days. Continue to monitor urine and stool for signs and symptoms of bleeding. Please notify the clinic of any medication changes.

## 2021-05-06 ENCOUNTER — TELEPHONE (OUTPATIENT)
Dept: PHARMACY | Age: 56
End: 2021-05-06

## 2021-05-07 ENCOUNTER — HOSPITAL ENCOUNTER (OUTPATIENT)
Dept: PHARMACY | Age: 56
Setting detail: THERAPIES SERIES
Discharge: HOME | End: 2021-05-07
Payer: COMMERCIAL

## 2021-05-07 VITALS
DIASTOLIC BLOOD PRESSURE: 56 MMHG | WEIGHT: 196 LBS | HEART RATE: 69 BPM | SYSTOLIC BLOOD PRESSURE: 97 MMHG | BODY MASS INDEX: 30.7 KG/M2

## 2021-05-07 DIAGNOSIS — Z95.2 S/P AVR (AORTIC VALVE REPLACEMENT): ICD-10-CM

## 2021-05-07 DIAGNOSIS — Z79.01 LONG TERM CURRENT USE OF ANTICOAGULANT THERAPY: ICD-10-CM

## 2021-05-07 LAB — INR BLD: 2

## 2021-05-07 PROCEDURE — 99211 OFF/OP EST MAY X REQ PHY/QHP: CPT

## 2021-05-07 PROCEDURE — 85610 PROTHROMBIN TIME: CPT

## 2021-05-07 NOTE — PROGRESS NOTES
[facility]/[place]  Medication Management  ANTICOAGULATION    Referring Provider: Jose D Gomes INR: 2-3    TODAY'S INR: 2.0    WARFARIN Dosage: Continue warfarin 2 tablets for 5 mg every Monday, Wednesday and Friday and whole 2.5 mg tablet all other days.      INR (no units)   Date Value   04/28/2021 1.7   04/20/2021 1.4   03/31/2021 1.7   02/17/2021 2.2   02/02/2021 4.2   12/17/2020 2.7   11/11/2020 1.9     Medication changes:  None    Notes:    Fingerstick INR drawn per clinic protocol. Patient states no visible blood in urine and no black tarry stool. Denies any missed doses of warfarin. No change in other maintenance medications or in diet. Will recheck INR in 2 weeks. Patient acknowledges working in consult agreement with pharmacist as referred by his/her physician. Patient is having a brain scan on 5-13-21 and if that is ok they will proceed with radiation of her lung CA. Patient is on oxygen all the time now.            CLINICAL PHARMACY CONSULT: MED RECONCILIATION/REVIEW ADDENDUM    For Pharmacy Admin Tracking Only     Intervention Detail:    Total # of Interventions Recommended: 1   Total # of Interventions Accepted: 1   Time Spent (min): 15      Stevie Mcdonnell PharmD

## 2021-05-07 NOTE — PATIENT INSTRUCTIONS
Continue to monitor urine and stool. Continue to monitor for signs of bleeding. Return to clinic in 6 weeks. Continue warfarin 2 tablets for 5 mg every Monday, Wednesday and Friday and whole 2.5 mg tablet all other days.

## 2021-05-19 ENCOUNTER — HOSPITAL ENCOUNTER (OUTPATIENT)
Dept: PHARMACY | Age: 56
Setting detail: THERAPIES SERIES
Discharge: HOME | End: 2021-05-19
Payer: COMMERCIAL

## 2021-05-19 VITALS
DIASTOLIC BLOOD PRESSURE: 61 MMHG | WEIGHT: 195 LBS | BODY MASS INDEX: 30.54 KG/M2 | HEART RATE: 65 BPM | SYSTOLIC BLOOD PRESSURE: 101 MMHG

## 2021-05-19 DIAGNOSIS — Z79.01 LONG TERM CURRENT USE OF ANTICOAGULANT THERAPY: Primary | ICD-10-CM

## 2021-05-19 DIAGNOSIS — Z95.2 S/P AVR (AORTIC VALVE REPLACEMENT): ICD-10-CM

## 2021-05-19 LAB — INR BLD: 2.9

## 2021-05-19 PROCEDURE — 99211 OFF/OP EST MAY X REQ PHY/QHP: CPT

## 2021-05-19 PROCEDURE — 85610 PROTHROMBIN TIME: CPT

## 2021-05-25 ENCOUNTER — APPOINTMENT (OUTPATIENT)
Dept: PHARMACY | Age: 56
End: 2021-05-25
Payer: COMMERCIAL

## 2021-06-09 ENCOUNTER — TELEPHONE (OUTPATIENT)
Dept: PHARMACY | Age: 56
End: 2021-06-09

## 2021-06-09 NOTE — TELEPHONE ENCOUNTER
Recent Travel Screening and Travel History documentation:     Travel Screening     Question   Response    In the last month, have you been in contact with someone who was confirmed or suspected to have Coronavirus / COVID-19? No / Unsure    Have you had a COVID-19 viral test in the last 14 days? No    Do you have any of the following new or worsening symptoms? None of these    Have you traveled internationally or domestically in the last month?   No      Travel History   Travel since 05/09/21     No documented travel since 05/09/21

## 2021-06-10 ENCOUNTER — HOSPITAL ENCOUNTER (OUTPATIENT)
Dept: PHARMACY | Age: 56
Setting detail: THERAPIES SERIES
Discharge: HOME | End: 2021-06-10
Payer: COMMERCIAL

## 2021-06-10 VITALS
BODY MASS INDEX: 30.07 KG/M2 | HEART RATE: 68 BPM | DIASTOLIC BLOOD PRESSURE: 54 MMHG | SYSTOLIC BLOOD PRESSURE: 93 MMHG | WEIGHT: 192 LBS

## 2021-06-10 DIAGNOSIS — Z95.2 S/P AVR (AORTIC VALVE REPLACEMENT): ICD-10-CM

## 2021-06-10 DIAGNOSIS — Z79.01 LONG TERM CURRENT USE OF ANTICOAGULANT THERAPY: Primary | ICD-10-CM

## 2021-06-10 LAB — INR BLD: 2.3

## 2021-06-10 PROCEDURE — 85610 PROTHROMBIN TIME: CPT

## 2021-06-10 PROCEDURE — 99211 OFF/OP EST MAY X REQ PHY/QHP: CPT

## 2021-06-10 NOTE — PATIENT INSTRUCTIONS
Continue to monitor urine and stool. Continue to monitor for signs of bleeding. Return to clinic in 5 weeks.    Continue warfarin 2 tablets for 5 mg every Monday, Wednesday and Friday and whole 2.5 mg tablet all other days.

## 2021-06-10 NOTE — PROGRESS NOTES
[facility] [address] [place] [address]/[place]  Medication Management  ANTICOAGULATION    Referring Provider: Jose Guadalupe Cameron     GOAL INR: 2-3     TODAY'S INR: 2.3     WARFARIN Dosage: Continue warfarin 2 tablets for 5 mg every MWF and whole 2.5 mg tablet all other days.     INR (no units)   Date Value   2021 2.9   2021 2   2021 1.7   2021 1.4   2021 1.7   2021 2.2   2021 4.2     Medication changes:  None    Notes:    Fingerstick INR drawn per clinic protocol. Patient states no visible blood in urine and no black tarry stool. Denies any missed doses of warfarin. No change in other maintenance medications or in diet. Will recheck INR in 5 weeks. Patient acknowledges working in consult agreement with pharmacist as referred by his/her physician. Patient had a brain scan done 21 -stroke found. Patient complains of headaches still but more mild now. Patient started radiation treatment for her lung CA. Only 3 treatments. Will probably be on the oxygen forever.                 CLINICAL PHARMACY CONSULT: MED RECONCILIATION/REVIEW ADDENDUM    For Pharmacy Admin Tracking Only     Intervention Detail: Adherence Monitorin   Total # of Interventions Recommended: 0   Total # of Interventions Accepted: 0   Time Spent (min): [address], [facility], PharmD

## 2021-07-14 ENCOUNTER — TELEPHONE (OUTPATIENT)
Dept: PHARMACY | Age: 56
End: 2021-07-14

## 2021-07-14 NOTE — TELEPHONE ENCOUNTER
Recent Travel Screening and Travel History documentation:     Travel Screening     Question   Response    In the last month, have you been in contact with someone who was confirmed or suspected to have Coronavirus / COVID-19? No / Unsure    Have you had a COVID-19 viral test in the last 14 days? No    Do you have any of the following new or worsening symptoms? None of these    Have you traveled internationally or domestically in the last month?   No      Travel History   Travel since 06/14/21     No documented travel since 06/14/21

## 2021-07-15 ENCOUNTER — HOSPITAL ENCOUNTER (OUTPATIENT)
Dept: PHARMACY | Age: 56
Setting detail: THERAPIES SERIES
Discharge: HOME | End: 2021-07-15
Payer: COMMERCIAL

## 2021-07-15 VITALS
DIASTOLIC BLOOD PRESSURE: 68 MMHG | BODY MASS INDEX: 30.95 KG/M2 | SYSTOLIC BLOOD PRESSURE: 96 MMHG | HEART RATE: 68 BPM | WEIGHT: 197.6 LBS

## 2021-07-15 DIAGNOSIS — Z79.01 LONG TERM CURRENT USE OF ANTICOAGULANT THERAPY: Primary | ICD-10-CM

## 2021-07-15 DIAGNOSIS — Z95.2 S/P AVR (AORTIC VALVE REPLACEMENT): ICD-10-CM

## 2021-07-15 LAB — INR BLD: 1.8

## 2021-07-15 PROCEDURE — 85610 PROTHROMBIN TIME: CPT

## 2021-07-15 PROCEDURE — 99211 OFF/OP EST MAY X REQ PHY/QHP: CPT

## 2021-07-15 RX ORDER — UMECLIDINIUM BROMIDE AND VILANTEROL TRIFENATATE 62.5; 25 UG/1; UG/1
1 POWDER RESPIRATORY (INHALATION) DAILY
COMMUNITY
Start: 2021-06-29 | End: 2021-10-22 | Stop reason: SDUPTHER

## 2021-07-28 ENCOUNTER — TELEPHONE (OUTPATIENT)
Dept: PHARMACY | Age: 56
End: 2021-07-28

## 2021-07-28 NOTE — TELEPHONE ENCOUNTER
Recent Travel Screening and Travel History documentation:     Travel Screening     Question   Response    In the last month, have you been in contact with someone who was confirmed or suspected to have Coronavirus / COVID-19? No / Unsure    Have you had a COVID-19 viral test in the last 14 days? No    Do you have any of the following new or worsening symptoms? None of these    Have you traveled internationally or domestically in the last month?   No      Travel History   Travel since 06/28/21     No documented travel since 06/28/21

## 2021-07-29 ENCOUNTER — HOSPITAL ENCOUNTER (OUTPATIENT)
Dept: PHARMACY | Age: 56
Setting detail: THERAPIES SERIES
Discharge: HOME | End: 2021-07-29
Payer: COMMERCIAL

## 2021-07-29 VITALS
DIASTOLIC BLOOD PRESSURE: 74 MMHG | SYSTOLIC BLOOD PRESSURE: 86 MMHG | WEIGHT: 197 LBS | BODY MASS INDEX: 30.85 KG/M2 | HEART RATE: 73 BPM

## 2021-07-29 DIAGNOSIS — Z79.01 LONG TERM CURRENT USE OF ANTICOAGULANT THERAPY: Primary | ICD-10-CM

## 2021-07-29 DIAGNOSIS — Z95.2 S/P AVR (AORTIC VALVE REPLACEMENT): ICD-10-CM

## 2021-07-29 LAB — INR BLD: 2.1

## 2021-07-29 PROCEDURE — 85610 PROTHROMBIN TIME: CPT

## 2021-07-29 PROCEDURE — 99211 OFF/OP EST MAY X REQ PHY/QHP: CPT

## 2021-07-29 NOTE — PATIENT INSTRUCTIONS
Continue to monitor urine and stool. Continue to monitor for signs of bleeding. Return to clinic in 4 weeks. Continue warfarin 2 tablets for 5 mg every MWF and whole 2.5 mg tablet all other days.

## 2021-07-29 NOTE — PROGRESS NOTES
[facility] [address] [place] [address]/[place]  Medication Management  ANTICOAGULATION    Referring Provider: Maria Luz     GOAL INR: 2-3     TODAY'S INR: 2.1     WARFARIN Dosage: Continue warfarin 2 tablets for 5 mg every MWF and whole 2.5 mg tablet all other days. INR (no units)   Date Value   07/15/2021 1.8   06/10/2021 2.3   2021 2.9   2021 2   2021 1.7   2021 1.4   2021 1.7     Medication changes:  None    Notes:    Fingerstick INR drawn per clinic protocol. Patient states no visible blood in urine and no black tarry stool. Denies any missed doses of warfarin. No change in other maintenance medications or in diet. Will recheck INR in 4 weeks. Patient acknowledges working in consult agreement with pharmacist as referred by his/her physician.                   For Pharmacy Admin Tracking Only     Intervention Detail: Adherence Monitorin   Total # of Interventions Recommended: 0   Total # of Interventions Accepted: 0   Time Spent (min): SELINA Monaco [facility], PharmD

## 2021-08-26 ENCOUNTER — TELEPHONE (OUTPATIENT)
Dept: PHARMACY | Age: 56
End: 2021-08-26

## 2021-08-26 NOTE — TELEPHONE ENCOUNTER
Recent Travel Screening and Travel History documentation:     Travel Screening     Question   Response    In the last month, have you been in contact with someone who was confirmed or suspected to have Coronavirus / COVID-19? Unable to assess    Have you had a COVID-19 viral test in the last 14 days? Unable to assess    Do you have any of the following new or worsening symptoms? Unable to assess    Have you traveled internationally or domestically in the last month? Unable to assess      Travel History   Travel since 07/26/21     No documented travel since 07/26/21         Had to leave [facility].     Claudio Landa, PharmD 8/26/2021 8:55 AM

## 2021-08-27 ENCOUNTER — HOSPITAL ENCOUNTER (OUTPATIENT)
Dept: PHARMACY | Age: 56
Setting detail: THERAPIES SERIES
Discharge: HOME | End: 2021-08-27
Payer: MEDICARE

## 2021-08-27 VITALS
BODY MASS INDEX: 30.54 KG/M2 | HEART RATE: 66 BPM | DIASTOLIC BLOOD PRESSURE: 69 MMHG | SYSTOLIC BLOOD PRESSURE: 104 MMHG | WEIGHT: 195 LBS

## 2021-08-27 DIAGNOSIS — Z95.2 S/P AVR (AORTIC VALVE REPLACEMENT): ICD-10-CM

## 2021-08-27 DIAGNOSIS — Z79.01 LONG TERM CURRENT USE OF ANTICOAGULANT THERAPY: Primary | ICD-10-CM

## 2021-08-27 LAB — INR BLD: 2.5

## 2021-08-27 PROCEDURE — 85610 PROTHROMBIN TIME: CPT

## 2021-08-27 PROCEDURE — 99211 OFF/OP EST MAY X REQ PHY/QHP: CPT

## 2021-08-27 NOTE — PATIENT INSTRUCTIONS
Continue current dose of warfarin as instructed on dosing calendar provided - 5 mg every Monday, Wednesday and Friday and 2.5 mg all other days. Return to clinic in 4 weeks. Continue to monitor urine and stool for signs and symptoms of bleeding. Please notify the clinic of any medication changes.

## 2021-09-12 ENCOUNTER — APPOINTMENT (OUTPATIENT)
Dept: GENERAL RADIOLOGY | Age: 56
End: 2021-09-12
Payer: COMMERCIAL

## 2021-09-12 ENCOUNTER — HOSPITAL ENCOUNTER (EMERGENCY)
Age: 56
Discharge: HOME | End: 2021-09-12
Attending: FAMILY MEDICINE
Payer: COMMERCIAL

## 2021-09-12 VITALS
SYSTOLIC BLOOD PRESSURE: 125 MMHG | TEMPERATURE: 98.2 F | OXYGEN SATURATION: 100 % | RESPIRATION RATE: 18 BRPM | HEART RATE: 66 BPM | DIASTOLIC BLOOD PRESSURE: 67 MMHG

## 2021-09-12 DIAGNOSIS — J06.9 ACUTE UPPER RESPIRATORY INFECTION: Primary | ICD-10-CM

## 2021-09-12 DIAGNOSIS — R05.9 COUGH: ICD-10-CM

## 2021-09-12 DIAGNOSIS — J44.9 CHRONIC OBSTRUCTIVE PULMONARY DISEASE, UNSPECIFIED COPD TYPE (HCC): ICD-10-CM

## 2021-09-12 DIAGNOSIS — R06.02 SHORTNESS OF BREATH: ICD-10-CM

## 2021-09-12 LAB
ABSOLUTE EOS #: 0 K/UL (ref 0–0.44)
ABSOLUTE IMMATURE GRANULOCYTE: 0 K/UL (ref 0–0.3)
ABSOLUTE LYMPH #: 1.52 K/UL (ref 1.1–3.7)
ABSOLUTE MONO #: 0.17 K/UL (ref 0.1–1.2)
ALBUMIN SERPL-MCNC: 3.8 G/DL (ref 3.5–5.2)
ALBUMIN/GLOBULIN RATIO: 1.3 (ref 1–2.5)
ALP BLD-CCNC: 82 U/L (ref 35–104)
ALT SERPL-CCNC: 14 U/L (ref 5–33)
ANION GAP SERPL CALCULATED.3IONS-SCNC: 10 MMOL/L (ref 9–17)
AST SERPL-CCNC: 21 U/L
BASOPHILS # BLD: 0 % (ref 0–2)
BASOPHILS ABSOLUTE: 0 K/UL (ref 0–0.2)
BILIRUB SERPL-MCNC: 0.4 MG/DL (ref 0.3–1.2)
BUN BLDV-MCNC: 8 MG/DL (ref 6–20)
BUN/CREAT BLD: 8 (ref 9–20)
CALCIUM SERPL-MCNC: 8.5 MG/DL (ref 8.6–10.4)
CHLORIDE BLD-SCNC: 110 MMOL/L (ref 98–107)
CO2: 20 MMOL/L (ref 20–31)
CREAT SERPL-MCNC: 1.06 MG/DL (ref 0.5–0.9)
DIFFERENTIAL TYPE: ABNORMAL
EOSINOPHILS RELATIVE PERCENT: 0 % (ref 1–4)
GFR AFRICAN AMERICAN: >60 ML/MIN
GFR NON-AFRICAN AMERICAN: 54 ML/MIN
GFR SERPL CREATININE-BSD FRML MDRD: ABNORMAL ML/MIN/{1.73_M2}
GFR SERPL CREATININE-BSD FRML MDRD: ABNORMAL ML/MIN/{1.73_M2}
GLUCOSE BLD-MCNC: 86 MG/DL (ref 70–99)
HCT VFR BLD CALC: 33.8 % (ref 36.3–47.1)
HEMOGLOBIN: 10.3 G/DL (ref 11.9–15.1)
IMMATURE GRANULOCYTES: 0 %
LACTIC ACID, WHOLE BLOOD: NORMAL MMOL/L (ref 0.7–2.1)
LACTIC ACID: 0.7 MMOL/L (ref 0.5–2.2)
LYMPHOCYTES # BLD: 46 % (ref 24–43)
MCH RBC QN AUTO: 28.1 PG (ref 25.2–33.5)
MCHC RBC AUTO-ENTMCNC: 30.5 G/DL (ref 28.4–34.8)
MCV RBC AUTO: 92.3 FL (ref 82.6–102.9)
MONOCYTES # BLD: 5 % (ref 3–12)
MORPHOLOGY: NORMAL
NRBC AUTOMATED: 0 PER 100 WBC
PDW BLD-RTO: 14.2 % (ref 11.8–14.4)
PLATELET # BLD: ABNORMAL K/UL (ref 138–453)
PLATELET ESTIMATE: ABNORMAL
PLATELET, FLUORESCENCE: 127 K/UL (ref 138–453)
PLATELET, IMMATURE FRACTION: 5 % (ref 1.1–10.3)
PMV BLD AUTO: ABNORMAL FL (ref 8.1–13.5)
POTASSIUM SERPL-SCNC: 3.9 MMOL/L (ref 3.7–5.3)
RBC # BLD: 3.66 M/UL (ref 3.95–5.11)
RBC # BLD: ABNORMAL 10*6/UL
SARS-COV-2, RAPID: NOT DETECTED
SEG NEUTROPHILS: 49 % (ref 36–65)
SEGMENTED NEUTROPHILS ABSOLUTE COUNT: 1.61 K/UL (ref 1.5–8.1)
SODIUM BLD-SCNC: 140 MMOL/L (ref 135–144)
SPECIMEN DESCRIPTION: NORMAL
TOTAL PROTEIN: 6.8 G/DL (ref 6.4–8.3)
WBC # BLD: 3.3 K/UL (ref 3.5–11.3)
WBC # BLD: ABNORMAL 10*3/UL

## 2021-09-12 PROCEDURE — 83605 ASSAY OF LACTIC ACID: CPT

## 2021-09-12 PROCEDURE — 94664 DEMO&/EVAL PT USE INHALER: CPT

## 2021-09-12 PROCEDURE — 6360000002 HC RX W HCPCS: Performed by: FAMILY MEDICINE

## 2021-09-12 PROCEDURE — 99284 EMERGENCY DEPT VISIT MOD MDM: CPT

## 2021-09-12 PROCEDURE — C9803 HOPD COVID-19 SPEC COLLECT: HCPCS

## 2021-09-12 PROCEDURE — 85055 RETICULATED PLATELET ASSAY: CPT

## 2021-09-12 PROCEDURE — 87635 SARS-COV-2 COVID-19 AMP PRB: CPT

## 2021-09-12 PROCEDURE — 71045 X-RAY EXAM CHEST 1 VIEW: CPT

## 2021-09-12 PROCEDURE — 80053 COMPREHEN METABOLIC PANEL: CPT

## 2021-09-12 PROCEDURE — 36415 COLL VENOUS BLD VENIPUNCTURE: CPT

## 2021-09-12 PROCEDURE — 96374 THER/PROPH/DIAG INJ IV PUSH: CPT

## 2021-09-12 PROCEDURE — 85025 COMPLETE CBC W/AUTO DIFF WBC: CPT

## 2021-09-12 RX ORDER — METHYLPREDNISOLONE SODIUM SUCCINATE 125 MG/2ML
125 INJECTION, POWDER, LYOPHILIZED, FOR SOLUTION INTRAMUSCULAR; INTRAVENOUS ONCE
Status: COMPLETED | OUTPATIENT
Start: 2021-09-12 | End: 2021-09-12

## 2021-09-12 RX ORDER — METHYLPREDNISOLONE 4 MG/1
TABLET ORAL
Qty: 1 KIT | Refills: 0 | Status: SHIPPED | OUTPATIENT
Start: 2021-09-12 | End: 2021-09-18

## 2021-09-12 RX ORDER — ALBUTEROL SULFATE 2.5 MG/3ML
2.5 SOLUTION RESPIRATORY (INHALATION) ONCE
Status: COMPLETED | OUTPATIENT
Start: 2021-09-12 | End: 2021-09-12

## 2021-09-12 RX ADMIN — METHYLPREDNISOLONE SODIUM SUCCINATE 125 MG: 125 INJECTION, POWDER, FOR SOLUTION INTRAMUSCULAR; INTRAVENOUS at 15:18

## 2021-09-12 RX ADMIN — ALBUTEROL SULFATE 2.5 MG: 2.5 SOLUTION RESPIRATORY (INHALATION) at 15:46

## 2021-09-12 ASSESSMENT — ENCOUNTER SYMPTOMS
STRIDOR: 0
SHORTNESS OF BREATH: 1
COUGH: 1
CHOKING: 1
WHEEZING: 1
EYES NEGATIVE: 1
APNEA: 0
CHEST TIGHTNESS: 0
GASTROINTESTINAL NEGATIVE: 1

## 2021-09-12 NOTE — ED PROVIDER NOTES
 Cancer [facility])     breast    Cerebral artery occlusion with cerebral infarction ([facility]     COPD (chronic obstructive pulmonary disease) (HCC)     Hx of blood clots     Hypertension     Lupus ([facility]     Seizures (HCC)          SURGICALHISTORY       Past Surgical History:   Procedure Laterality Date    BREAST SURGERY Right     mastectomy    CARDIAC SURGERY      AVR    COLONOSCOPY  09/04/2019    -hemorrhoids    COLONOSCOPY N/A 9/4/2019    COLORECTAL CANCER SCREENING, NOT HIGH RISK performed by Otis Aguilar MD at [address] [place] [address]       Previous Medications    ACETAMINOPHEN (TYLENOL) 325 MG TABLET    Take 650 mg by mouth every 4 hours as needed    ALBUTEROL SULFATE  (90 BASE) MCG/ACT INHALER    Inhale 2 puffs into the lungs every 6 hours as needed    ALENDRONATE (FOSAMAX) 70 MG TABLET    Take 70 mg by mouth every 7 days    ALPRAZOLAM (XANAX) 0.5 MG TABLET    Take 0.5 mg by mouth daily as needed. .    ARIPIPRAZOLE (ABILIFY) 10 MG TABLET    Take 10 mg by mouth daily    ASPIRIN 81 MG EC TABLET    Take 81 mg by mouth daily.     CALCIUM-MAGNESIUM-VITAMIN D (CITRACAL CALCIUM+D PO)    Take 500 mg by mouth daily    CITALOPRAM (CELEXA) 40 MG TABLET    Take 40 mg by mouth daily    HYDROXYCHLOROQUINE (PLAQUENIL) 200 MG TABLET    Take 400 mg by mouth daily     METOPROLOL SUCCINATE (TOPROL XL) 25 MG EXTENDED RELEASE TABLET    Take 25 mg by mouth 2 times daily 25mg in AM, 12.5mg in PM    NITROGLYCERIN (NITROSTAT) 0.4 MG SL TABLET    Place 0.4 mg under the tongue as needed    OMEPRAZOLE (PRILOSEC) 20 MG DELAYED RELEASE CAPSULE    Take 20 mg by mouth daily    ROSUVASTATIN (CRESTOR) 20 MG TABLET    Take 20 mg by mouth nightly    UMECLIDINIUM-VILANTEROL (ANORO ELLIPTA) 62.5-25 MCG/INH AEPB INHALER    Inhale 1 puff into the lungs daily    WARFARIN (COUMADIN) 2.5 MG TABLET    Take 2.5 mg by mouth See Admin Instructions Coumadin clinic  5 mg MWF; 2.5 mg all other days ZONISAMIDE (ZONEGRAN) 100 MG CAPSULE    Take 200 mg by mouth daily             Coreg [carvedilol]    FAMILY HISTORY     History reviewed. No pertinent family history. SOCIAL HISTORY       Social History     Socioeconomic History    Marital status:      Spouse name: None    Number of children: None    Years of education: None    Highest education level: None   Occupational History    None   Tobacco Use    Smoking status: Former Smoker     Packs/day: 1.50     Quit date: 4/24/2018     Years since quitting: 3.3    Smokeless tobacco: Never Used   Vaping Use    Vaping Use: Every day    Substances: Always   Substance and Sexual Activity    Alcohol use: No    Drug use: No    Sexual activity: None   Other Topics Concern    None   Social History Narrative    None     Social Determinants of Health     Financial Resource Strain:     Difficulty of Paying Living Expenses:    Food Insecurity:     Worried About Running Out of Food in the Last Year:     Ran Out of Food in the Last Year:    Transportation Needs:     Lack of Transportation (Medical):      Lack of Transportation (Non-Medical):    Physical Activity:     Days of Exercise per Week:     Minutes of Exercise per Session:    Stress:     Feeling of Stress :    Social Connections:     Frequency of Communication with Friends and Family:     Frequency of Social Gatherings with Friends and Family:     Attends Zoroastrianism Services:     Active Member of Clubs or Organizations:     Attends Club or Organization Meetings:     Marital Status:    Intimate Partner Violence:     Fear of Current or Ex-Partner:     Emotionally Abused:     Physically Abused:     Sexually Abused:        SCREENINGS      [place] Coma Scale  Eye Opening: Spontaneous  Best Verbal Response: Oriented  Best Motor Response: Obeys commands  Negro Coma Scale Score: 15             PHYSICAL EXAM    (up to 7 for level 4, 8 or more for level 5)     ED Triage Vitals [09/12/21 1338] BP Temp Temp Source Pulse Resp SpO2 Height Weight   115/84 98.2 °F (36.8 °C) Tympanic 77 18 98 % -- --       Physical Exam  Vitals and nursing note reviewed. Constitutional:       Appearance: She is well-developed. Interventions: She is not intubated. HENT:      Head: Normocephalic and atraumatic. Eyes:      Extraocular Movements: Extraocular movements intact. Pupils: Pupils are equal, round, and reactive to light. Cardiovascular:      Rate and Rhythm: Normal rate and regular rhythm. Pulmonary:      Effort: Pulmonary effort is normal. No tachypnea, bradypnea, accessory muscle usage or respiratory distress. She is not intubated. Breath sounds: No stridor. Examination of the right-lower field reveals wheezing. Examination of the left-lower field reveals wheezing. Wheezing present. No decreased breath sounds, rhonchi or rales. Musculoskeletal:         General: Normal range of motion. Right lower leg: No tenderness. No edema. Left lower leg: No tenderness. No edema. Skin:     General: Skin is warm. Capillary Refill: Capillary refill takes less than 2 seconds. Neurological:      General: No focal deficit present. Mental Status: She is alert.          DIAGNOSTIC RESULTS     EKG: All EKG's are interpreted by the Emergency Department Physician who either signs orCo-signs this chart in the absence of a cardiologist.        RADIOLOGY:   plain film images such as CT, Ultrasound and MRI are read by the radiologist. Plain radiographic images are visualized and preliminarily interpreted by the emergency physician with the below findings:        Interpretation per the Radiologist below, ifavailable at the time of this note:    XR CHEST (SINGLE VIEW FRONTAL)   Final Result   Left basilar linear opacities could represent subsegmental atelectasis or   infection               ED BEDSIDE ULTRASOUND:   Performed by ED Physician - none    LABS:  Labs Reviewed   CBC WITH AUTO DIFFERENTIAL - Abnormal; Notable for the following components:       Result Value    WBC 3.3 (*)     RBC 3.66 (*)     Hemoglobin 10.3 (*)     Hematocrit 33.8 (*)     Lymphocytes 46 (*)     Eosinophils % 0 (*)     All other components within normal limits   COMPREHENSIVE METABOLIC PANEL - Abnormal; Notable for the following components:    CREATININE 1.06 (*)     Bun/Cre Ratio 8 (*)     Calcium 8.5 (*)     Chloride 110 (*)     GFR Non- 54 (*)     All other components within normal limits   IMMATURE PLATELET FRACTION - Abnormal; Notable for the following components:    Platelet, Fluorescence 127 (*)     All other components within normal limits   COVID-19, RAPID   LACTIC ACID, PLASMA       All other labs were within normal range ornot returned as of this dictation. EMERGENCY DEPARTMENT COURSE and DIFFERENTIAL DIAGNOSIS/MDM:   Vitals:    Vitals:    09/12/21 1338 09/12/21 1548 09/12/21 1611   BP: 115/84  125/67   Pulse: 77  66   Resp: 18  18   Temp: 98.2 °F (36.8 °C)     TempSrc: Tympanic     SpO2: 98% 98% 100%           MDM  Number of Diagnoses or Management Options  Acute upper respiratory infection  Chronic obstructive pulmonary disease, unspecified COPD type (HCC)  Cough  Shortness of breath  Diagnosis management comments: 1610 -patient feels a lot better she is ready go home. She reports marked improvement with steroids and breathing treatment. She follow-up with her PCP in the morning for further evaluation and treatment. Believe that this is a COPD exacerbation possibly exacerbated by upper respiratory infection. Her COVID-19 test was negative. She is totally advised to return to the ER if she has any expression of symptoms. CRITICAL CARE TIME   Total CriticalCare time was  minutes, excluding separately reportable procedures. There was a high probability of clinically significant/life threatening deterioration in the patient's condition which required my urgent intervention. CONSULTS:  None    PROCEDURES:  Unlessotherwise noted below, none     Procedures    FINAL IMPRESSION      1. Acute upper respiratory infection    2. Cough    3. Shortness of breath    4. Chronic obstructive pulmonary disease, unspecified COPD type ([email or web address]          DISPOSITION/PLAN   DISPOSITION        PATIENT REFERRED TO:  your Md    In 1 day        DISCHARGE MEDICATIONS:  New Prescriptions    METHYLPREDNISOLONE (MEDROL, NOEMI,) 4 MG TABLET    Take by mouth.               (Please note that portions of this note were completed with a voice recognition program.  Efforts were made to edit the dictations but occasionally words are mis-transcribed.)      Kamran Rice MD (electronically signed)  Attending Emergency Physician    '      Kamran Rice MD  09/15/21 0030

## 2021-09-23 ENCOUNTER — TELEPHONE (OUTPATIENT)
Dept: PHARMACY | Age: 56
End: 2021-09-23

## 2021-09-23 NOTE — TELEPHONE ENCOUNTER
Recent Travel Screening and Travel History documentation:     Travel Screening     Question   Response    In the last month, have you been in contact with someone who was confirmed or suspected to have Coronavirus / COVID-19? Unable to assess    Have you had a COVID-19 viral test in the last 14 days? Unable to assess    Do you have any of the following new or worsening symptoms? Unable to assess    Have you traveled internationally or domestically in the last month? Unable to assess      Travel History   Travel since 08/23/21     No documented travel since 08/23/21         Had to leave [facility].     Leatha Stallworth, PharmD 9/23/2021 10:22 AM

## 2021-09-24 ENCOUNTER — HOSPITAL ENCOUNTER (OUTPATIENT)
Dept: PHARMACY | Age: 56
Setting detail: THERAPIES SERIES
Discharge: HOME | End: 2021-09-24
Payer: COMMERCIAL

## 2021-09-24 VITALS
DIASTOLIC BLOOD PRESSURE: 51 MMHG | WEIGHT: 196 LBS | BODY MASS INDEX: 30.7 KG/M2 | SYSTOLIC BLOOD PRESSURE: 88 MMHG | HEART RATE: 71 BPM

## 2021-09-24 DIAGNOSIS — Z95.2 S/P AVR (AORTIC VALVE REPLACEMENT): ICD-10-CM

## 2021-09-24 DIAGNOSIS — Z79.01 LONG TERM CURRENT USE OF ANTICOAGULANT THERAPY: Primary | ICD-10-CM

## 2021-09-24 LAB — INR BLD: 1.8

## 2021-09-24 PROCEDURE — 85610 PROTHROMBIN TIME: CPT

## 2021-09-24 PROCEDURE — 99212 OFFICE O/P EST SF 10 MIN: CPT

## 2021-09-24 RX ORDER — UMECLIDINIUM BROMIDE AND VILANTEROL TRIFENATATE 62.5; 25 UG/1; UG/1
1 POWDER RESPIRATORY (INHALATION) DAILY
COMMUNITY
End: 2021-10-22 | Stop reason: ALTCHOICE

## 2021-09-24 NOTE — PROGRESS NOTES
[facility] [address] [facility]/[place]  Medication Management  ANTICOAGULATION    Referring Provider: Ellen Pope     GOAL INR: 2-3     TODAY'S INR: 1.8     WARFARIN Dosage: Take warfarin 3 tablets for 7.5 mg today then continue warfarin 2 tablets for 5 mg every MWF and whole 2.5 mg tablet all other days. INR (no units)   Date Value   2021 2.5   2021 2.1   07/15/2021 1.8   06/10/2021 2.3   2021 2.9   2021 2   2021 1.7     Medication changes:  Finished a medrol dose pack for upper respiratory infection. Notes:    Fingerstick INR drawn per clinic protocol. Patient states no visible blood in urine and no black tarry stool. Denies any missed doses of warfarin. No change in other maintenance medications or in diet. Will recheck INR in 4 weeks. Patient acknowledges working in consult agreement with pharmacist as referred by his/her physician. Finished a medrol dose pack for upper respiratory infection. Encourage patient to call her cardiologist about her BP.         For Pharmacy Admin Tracking Only     Intervention Detail: Adherence Monitorin, Dose Adjustment: 1, reason: Therapy Optimization and New Rx: 1, reason: Needs Additional Therapy   Total # of Interventions Recommended: 2   Total # of Interventions Accepted: 2   Time Spent (min): [address] [facility], [facility], PharmD

## 2021-09-24 NOTE — PATIENT INSTRUCTIONS
Continue to monitor urine and stool. Continue to monitor for signs of bleeding. Return to clinic in 4 weeks. Take warfarin 3 tablets for 7.5 mg today then continue warfarin 2 tablets for 5 mg every MWF and whole 2.5 mg tablet all other days.

## 2021-10-21 ENCOUNTER — TELEPHONE (OUTPATIENT)
Dept: PHARMACY | Age: 56
End: 2021-10-21

## 2021-10-22 ENCOUNTER — HOSPITAL ENCOUNTER (OUTPATIENT)
Dept: PHARMACY | Age: 56
Setting detail: THERAPIES SERIES
Discharge: HOME | End: 2021-10-22
Payer: COMMERCIAL

## 2021-10-22 VITALS
HEART RATE: 70 BPM | SYSTOLIC BLOOD PRESSURE: 128 MMHG | BODY MASS INDEX: 30.7 KG/M2 | DIASTOLIC BLOOD PRESSURE: 72 MMHG | WEIGHT: 196 LBS

## 2021-10-22 DIAGNOSIS — Z95.2 S/P AVR (AORTIC VALVE REPLACEMENT): ICD-10-CM

## 2021-10-22 DIAGNOSIS — Z79.01 LONG TERM CURRENT USE OF ANTICOAGULANT THERAPY: Primary | ICD-10-CM

## 2021-10-22 LAB — INR BLD: 2.1

## 2021-10-22 PROCEDURE — 99211 OFF/OP EST MAY X REQ PHY/QHP: CPT

## 2021-10-22 PROCEDURE — 85610 PROTHROMBIN TIME: CPT

## 2021-10-22 RX ORDER — BUDESONIDE, GLYCOPYRROLATE, AND FORMOTEROL FUMARATE 160; 9; 4.8 UG/1; UG/1; UG/1
2 AEROSOL, METERED RESPIRATORY (INHALATION) 2 TIMES DAILY
COMMUNITY
Start: 2021-10-11

## 2021-10-22 NOTE — PATIENT INSTRUCTIONS
Continue to monitor urine and stool. Continue to monitor for signs of bleeding. Return to clinic in 6 weeks. Continue warfarin 2 tablets for 5 mg every MWF and whole 2.5 mg tablet all other days.

## 2021-10-22 NOTE — PROGRESS NOTES
[facility] [address] [place] [address]/[place]  Medication Management  ANTICOAGULATION    Referring Provider: Maria Luz     GOAL INR: 2-3     TODAY'S INR: 2.1     WARFARIN Dosage: Continue warfarin 2 tablets for 5 mg every MWF and whole 2.5 mg tablet all other days. INR (no units)   Date Value   2021 1.8   2021 2.5   2021 2.1   07/15/2021 1.8   06/10/2021 2.3   2021 2.9   2021 2     Medication changes:  Started Breztri  Stopped Anoro Ellipta    Notes:    Fingerstick INR drawn per clinic protocol. Patient states no visible blood in urine and no black tarry stool. Denies any missed doses of warfarin. No change in other maintenance medications or in diet. Will recheck INR in 6 weeks. Patient acknowledges working in consult agreement with pharmacist as referred by his/her physician.                   For Pharmacy Admin Tracking Only     Intervention Detail: Adherence Monitorin and New Rx: 2, reason: Needs Additional Therapy   Total # of Interventions Recommended: 2   Total # of Interventions Accepted: 2   Time Spent (min): 1300 [facility], PharmD

## 2021-12-02 ENCOUNTER — TELEPHONE (OUTPATIENT)
Dept: PHARMACY | Age: 56
End: 2021-12-02

## 2021-12-02 NOTE — TELEPHONE ENCOUNTER
Recent Travel Screening and Travel History documentation:     Travel Screening     Question   Response    In the last month, have you been in contact with someone who was confirmed or suspected to have Coronavirus / COVID-19? No / Unsure    Have you had a COVID-19 viral test in the last 14 days? No    Do you have any of the following new or worsening symptoms? None of these    Have you traveled internationally or domestically in the last month?   No      Travel History   Travel since 11/02/21    No documented travel since 11/02/21        Christina Owens, PharmD 12/2/2021 11:42 AM

## 2021-12-03 ENCOUNTER — HOSPITAL ENCOUNTER (OUTPATIENT)
Dept: PHARMACY | Age: 56
Setting detail: THERAPIES SERIES
Discharge: HOME | End: 2021-12-03
Payer: COMMERCIAL

## 2021-12-03 VITALS
DIASTOLIC BLOOD PRESSURE: 66 MMHG | SYSTOLIC BLOOD PRESSURE: 105 MMHG | HEART RATE: 75 BPM | BODY MASS INDEX: 31.01 KG/M2 | WEIGHT: 198 LBS

## 2021-12-03 DIAGNOSIS — Z79.01 LONG TERM CURRENT USE OF ANTICOAGULANT THERAPY: Primary | ICD-10-CM

## 2021-12-03 DIAGNOSIS — Z95.2 S/P AVR (AORTIC VALVE REPLACEMENT): ICD-10-CM

## 2021-12-03 LAB — INR BLD: 2.1

## 2021-12-03 PROCEDURE — 99211 OFF/OP EST MAY X REQ PHY/QHP: CPT

## 2021-12-03 PROCEDURE — 85610 PROTHROMBIN TIME: CPT

## 2021-12-03 NOTE — PROGRESS NOTES
[facility] [address] [place] [address]/[place]  Medication Management  ANTICOAGULATION    Referring Provider: Ashley Dudley INR: 2-3    TODAY'S INR: 2.1    WARFARIN Dosage: Continue warfarin 5 mg po every MWF; 2.5 mg po all other days    INR (no units)   Date Value   2021 2.1   10/22/2021 2.1   2021 1.8   2021 2.5   2021 2.1   07/15/2021 1.8   06/10/2021 2.3       Medication changes:  No changes  Notes:    Fingerstick INR drawn per clinic protocol. Patient states no visible blood in urine and no black tarry stool. Denies any missed doses of warfarin. No change in other maintenance medications or in diet. Will recheck INR in 6 weeks. Patient acknowledges working in consult agreement with pharmacist as referred by his/her physician. For Pharmacy Admin Tracking Only     Intervention Detail: Adherence Monitorin   Total # of Interventions Recommended: 0   Total # of Interventions Accepted: 0   Time Spent (min): [address] [facility] [address]  Cici Pena [facility]

## 2021-12-28 ENCOUNTER — OFFICE VISIT (OUTPATIENT)
Dept: CARDIOLOGY | Age: 56
End: 2021-12-28
Payer: COMMERCIAL

## 2021-12-28 VITALS
RESPIRATION RATE: 20 BRPM | OXYGEN SATURATION: 97 % | HEART RATE: 74 BPM | SYSTOLIC BLOOD PRESSURE: 119 MMHG | WEIGHT: 197 LBS | HEIGHT: 66 IN | BODY MASS INDEX: 31.66 KG/M2 | DIASTOLIC BLOOD PRESSURE: 77 MMHG

## 2021-12-28 DIAGNOSIS — E78.5 DYSLIPIDEMIA: ICD-10-CM

## 2021-12-28 DIAGNOSIS — I25.10 ASHD (ARTERIOSCLEROTIC HEART DISEASE): ICD-10-CM

## 2021-12-28 DIAGNOSIS — R94.31 ABNORMAL ECG: ICD-10-CM

## 2021-12-28 DIAGNOSIS — Z79.01 CHRONIC ANTICOAGULATION: ICD-10-CM

## 2021-12-28 DIAGNOSIS — Z95.2 S/P AVR (AORTIC VALVE REPLACEMENT): Primary | ICD-10-CM

## 2021-12-28 PROCEDURE — G8417 CALC BMI ABV UP PARAM F/U: HCPCS | Performed by: INTERNAL MEDICINE

## 2021-12-28 PROCEDURE — G8484 FLU IMMUNIZE NO ADMIN: HCPCS | Performed by: INTERNAL MEDICINE

## 2021-12-28 PROCEDURE — 1036F TOBACCO NON-USER: CPT | Performed by: INTERNAL MEDICINE

## 2021-12-28 PROCEDURE — 99214 OFFICE O/P EST MOD 30 MIN: CPT | Performed by: INTERNAL MEDICINE

## 2021-12-28 PROCEDURE — G8427 DOCREV CUR MEDS BY ELIG CLIN: HCPCS | Performed by: INTERNAL MEDICINE

## 2021-12-28 PROCEDURE — 3017F COLORECTAL CA SCREEN DOC REV: CPT | Performed by: INTERNAL MEDICINE

## 2021-12-28 PROCEDURE — 93000 ELECTROCARDIOGRAM COMPLETE: CPT | Performed by: INTERNAL MEDICINE

## 2021-12-28 NOTE — PROGRESS NOTES
Barbra Gillette am scribing for and in the presence of Mariaelena Landa MD, F.A.C.C..    Patient: Lily Nath  : 1965  Date of Visit: 2021    REASON FOR VISIT / CONSULTATION: 1 Year Follow Up (HX: CAD, HLD, S/p AVR. Pt states she is doing well. Denies: CP, Palpitaiotns, Lighteaded/dizziness, SOB on nasal cannal 2L continus.)      Dear Jen Orozco,    I had the pleasure of seeing your patient Lily Nath in consultation today. Ms. Sarah Pope is a 64 y.o. female with extensive medical history that includes. 1-History of systemic lupus and antiphospholipid antibody syndrome diagnosed at age 27. 2-History of stroke secondary to hypercoagulable state treated for many years with warfarin, switched to Xarelto (used it for several years until 2018). 3-History of myocardial infarction in , most likely embolic. She has several cardiac catheterizations her most recent one was prior to her aortic valve surgery in 2018, no significant CAD. 4-Bicuspid aortic valve with severe aortic stenosis, S/P aortic valve [facility] on 2018. 5-Long history of smoking and COPD. Currently with chronic hypoxemic respiratory failure on home oxygen therapy. 6-ECG done (2019) in office: Normal Sinus rhythm. ST and T waves abnormalities in inferolateral leads. Grossly unchanged from before. 7-Echocardiogram done on 2019 showed an EF of 55%. LV wall thickness is mildly increased. 8- EKG done in office on 2020 showed with rhythm, ST segments depression and T wave abnormalities in the lateral leads are grossly unchanged from prior. 9- EKG done today (2021) showed no ischemic changes from prior EKG. Ms. Sarah Pope is here for yearly follow up. She is doing well. Her COPD remains stable at this time. She does follow up with [facility] for lung issues. She is on nasal canal two litters continuously.  Her breathing has not changed since last visit. She denied any current or recent chest pain, pressure, or tightness. She denies any palpitations. No abdominal pain, nausea or vomiting. She has had some diarrhea over the past couple of days however she thinks it is just due to a stomach bug. No bleeding problems, bowel issues, problems with her medications or any other concerns at this time. She never hears her mechanical click. Bleeding Risks: Ms. Lan Tong denies any current or recent bleeding problems including a history of a GI bleed, ulcers, recent or upcoming surgeries, blood in her stool or black tarry stools or blood in her urine. Past Medical History:   Diagnosis Date    Aortic valve stenosis     CAD (coronary artery disease)     Cancer (HCC)     breast    Cerebral artery occlusion with cerebral infarction (HCC)     COPD (chronic obstructive pulmonary disease) (HCC)     Hx of blood clots     Hypertension     Lupus ([address]     Seizures (HCC)        CURRENT ALLERGIES: Coreg [carvedilol] REVIEW OF SYSTEMS: 14 systems were reviewed. Pertinent positives and negatives as above, all else negative. Past Surgical History:   Procedure Laterality Date    BREAST SURGERY Right     mastectomy    CARDIAC SURGERY      AVR    COLONOSCOPY  2019    -hemorrhoids    COLONOSCOPY N/A 2019    COLORECTAL CANCER SCREENING, NOT HIGH RISK performed by Delford Homans, MD at [address]      Social History:  Social History     Tobacco Use    Smoking status: Former Smoker     Packs/day: 1.50     Quit date: 2018     Years since quitting: 3.6    Smokeless tobacco: Never Used   Vaping Use    Vaping Use: Every day    Substances: Always   Substance Use Topics    Alcohol use: No    Drug use: No        Family history includes; father  at age 61. Maternal aunt with breast cancer/hysterectomy. Maternal uncle with lymphoma on diet shortly after diagnosis.     CURRENT MEDICATIONS:  Outpatient Medications Marked as Taking for the 12/28/21 encounter (Office Visit) with Teresa Garcia MD   Medication Sig Dispense Refill    BREZTRI AEROSPHERE 160-9-4.8 MCG/ACT AERO Inhale 2 puffs into the lungs 2 times daily      ARIPiprazole (ABILIFY) 10 MG tablet Take 10 mg by mouth daily      alendronate (FOSAMAX) 70 MG tablet Take 70 mg by mouth every 7 days      hydroxychloroquine (PLAQUENIL) 200 MG tablet Take 400 mg by mouth daily       Calcium-Magnesium-Vitamin D (CITRACAL CALCIUM+D PO) Take 500 mg by mouth daily      citalopram (CELEXA) 40 MG tablet Take 40 mg by mouth daily      nitroGLYCERIN (NITROSTAT) 0.4 MG SL tablet Place 0.4 mg under the tongue as needed      metoprolol succinate (TOPROL XL) 25 MG extended release tablet Take 25 mg by mouth 2 times daily 25mg in AM, 12.5mg in PM      warfarin (COUMADIN) 2.5 MG tablet Take 2.5 mg by mouth See Admin Instructions Coumadin clinic  5 mg MWF; 2.5 mg all other days      albuterol sulfate  (90 Base) MCG/ACT inhaler Inhale 2 puffs into the lungs every 6 hours as needed      rosuvastatin (CRESTOR) 20 MG tablet Take 20 mg by mouth nightly      ALPRAZolam (XANAX) 0.5 MG tablet Take 0.5 mg by mouth daily as needed. Jolyne Jasmine zonisamide (ZONEGRAN) 100 MG capsule Take 200 mg by mouth daily       acetaminophen (TYLENOL) 325 MG tablet Take 650 mg by mouth every 4 hours as needed      omeprazole (PRILOSEC) 20 MG delayed release capsule Take 20 mg by mouth daily      aspirin 81 MG EC tablet Take 81 mg by mouth daily. Physical Examination:     /77 (Site: Left Upper Arm, Position: Sitting, Cuff Size: Medium Adult)   Pulse 74   Resp 20   Ht 5' 6\" (1.676 m)   Wt 197 lb (89.4 kg)   SpO2 97%   BMI 31.80 kg/m²  Body mass index is 31.8 kg/m². Constitutional: She appeared oriented to person and place. She appears well-developed and well-nourished. In no acute distress. HEENT: Normocephalic and atraumatic. No JVD present.  Carotid bruit is not present. No mass and no thyromegaly present. No lymphadenopathy noted. Cardiovascular: Normal rate, regular rhythm, normal S1, mechanical S2. 1/6 systolic murmur, 2nd intercostal space on the LEFT just lateral to the sternum  Pulmonary/Chest: Poor air entry bilaterally with diffuse wheezing. No significant crackles. Abdominal: Soft, non-tender. She exhibits no organomegaly, mass or bruit. Extremities: No significant edema. No cyanosis or clubbing. 2+ radial and carotid pulses. Distal extremity pulses: 2+ bilaterally. Neurological: Alertness and orientation as per Constitutional exam. No evidence of gross cranial nerve deficit. Coordination appeared normal.   Skin: Skin is warm and dry. There is no rash or diaphoresis. Psychiatric: She has a normal mood and affect. Her speech is normal and behavior is normal.          MOST RECENT LABS ON RECORD:   Lab Results   Component Value Date    WBC 3.3 (L) 09/12/2021    HGB 10.3 (L) 09/12/2021    HCT 33.8 (L) 09/12/2021    PLT See Reflexed IPF Result 09/12/2021    CHOL 134 12/18/2020    TRIG 93 12/18/2020    HDL 68 12/18/2020    ALT 14 09/12/2021    AST 21 09/12/2021     09/12/2021    K 3.9 09/12/2021     (H) 09/12/2021    CREATININE 1.06 (H) 09/12/2021    BUN 8 09/12/2021    CO2 20 09/12/2021    TSH 1.12 05/20/2013    INR 2.1 12/03/2021    LABA1C 4.9 07/31/2017    LABMICR 27 (H) 07/31/2017         ASSESSMENT:  1. S/P AVR (aortic valve replacement)    2. Chronic anticoagulation    3. ASHD (arteriosclerotic heart disease)    4. Abnormal ECG    5. Dyslipidemia       PLAN:  History of severe Aortic Stenosis: S/P Aortic Vavle Replacment done 4/24/2020 to 5/2/2020 at [facility] clinic. Reviewed her most recent testing from [facility]. Beta Blocker: Continue metoprolol succinate (Toprol XL) 25 mg twice daily. Anticoagulation: Continue Warfarin. Goal INR 2-3.  I also reminded her to watch for signs of blood in her stool or black tarry stools and stop the medication immediately if this develops as this could be life threatening. Continue aspirin as well. Additional Testing: I Ordered an Echocardiogram to assess Ms. Condon's ejection fraction and to look for significant valvular heart disease as a source of Ms. Condon symptoms    Nonobstructive CAD and history of stroke  Antiplatelet Agent: Continue aspirin 81 mg daily. I also reminded her to watch for signs of blood in her stool or black tarry stools and stop the medication immediately if this develops as this could be life threatening. Beta Blocker: Continue metoprolol succinate (Toprol XL)      Statin Therapy: Continue rosuvastatin (Crestor) 20 mg nightly. Lab Testing: Ordered a repeat Lipid Panel to reassess her LDL at this time. Abnormal EKG: No ischemic changes today in her repeat EKG  Significant ST segment changes but grossly unchanged from prior. Continue aspirin, Toprol-XL and Lipitor. History of normal coronary arteries back in 2018. Hyperlipidemia: Mixed, LDL done on 12/18/2020 was 47 mg/dL   Cholesterol Reduction Therapy: Continue rosuvastatin (Crestor) 20 mg daily. Lab Testing: Ordered a repeat Lipid Panel as above. In the meantime I asked her to continue taking her other medications and follow up with you as previously scheduled. FOLLOW UP:   I told Ms. Boo Santos to call my office if she had any problems, but otherwise told her to Return in about 1 year (around 12/28/2022). However, I would be happy to see her sooner should the need arise. Sincerely,  Radha Blakely MD, F.A.C.C. [facility] Cardiology Specialist    [address]  [facility], [address] [place] [address]  Phone: [phone], Fax: [phone]     I believe that the risk of significant morbidity and mortality related to the patient's current medical conditions are: intermediate-high.        The documentation recorded by the scribe, accurately and completely reflects the services I personally performed and the decisions made by me. Irais Berkowitz MD, F.A.C.C.  December 28, 2021

## 2021-12-28 NOTE — PATIENT INSTRUCTIONS
SURVEY:    You may be receiving a survey from [employer] regarding your visit today. Please complete the survey to enable us to provide the highest quality of care to you and your family. If you cannot score us a very good on any question, please call the office to discuss how we could have made your experience a very good one. Thank you.

## 2022-01-14 ENCOUNTER — HOSPITAL ENCOUNTER (OUTPATIENT)
Dept: PHARMACY | Age: 57
Setting detail: THERAPIES SERIES
Discharge: HOME | End: 2022-01-14
Payer: COMMERCIAL

## 2022-01-14 VITALS
WEIGHT: 198 LBS | SYSTOLIC BLOOD PRESSURE: 113 MMHG | BODY MASS INDEX: 31.96 KG/M2 | HEART RATE: 74 BPM | DIASTOLIC BLOOD PRESSURE: 64 MMHG

## 2022-01-14 DIAGNOSIS — Z79.01 LONG TERM CURRENT USE OF ANTICOAGULANT THERAPY: Primary | ICD-10-CM

## 2022-01-14 DIAGNOSIS — Z95.2 S/P AVR (AORTIC VALVE REPLACEMENT): ICD-10-CM

## 2022-01-14 LAB — INR BLD: 2.2

## 2022-01-14 PROCEDURE — 85610 PROTHROMBIN TIME: CPT

## 2022-01-14 PROCEDURE — 99211 OFF/OP EST MAY X REQ PHY/QHP: CPT

## 2022-01-14 NOTE — PATIENT INSTRUCTIONS
Continue current dose of warfarin as instructed on dosing calendar provided. Return to clinic in 6 weeks. Continue to monitor urine and stool for signs and symptoms of bleeding. Please notify the clinic of any medication changes. Please remember to bring all medications (both prescription and OTC) to your next visit. Kindly notify the clinic if you are unable to make to your next appointment.

## 2022-02-25 ENCOUNTER — HOSPITAL ENCOUNTER (OUTPATIENT)
Dept: PHARMACY | Age: 57
Setting detail: THERAPIES SERIES
Discharge: HOME | End: 2022-02-25
Payer: COMMERCIAL

## 2022-02-25 VITALS
SYSTOLIC BLOOD PRESSURE: 108 MMHG | HEART RATE: 76 BPM | BODY MASS INDEX: 31.15 KG/M2 | WEIGHT: 193 LBS | DIASTOLIC BLOOD PRESSURE: 62 MMHG

## 2022-02-25 DIAGNOSIS — Z95.2 S/P AVR (AORTIC VALVE REPLACEMENT): ICD-10-CM

## 2022-02-25 DIAGNOSIS — Z79.01 LONG TERM CURRENT USE OF ANTICOAGULANT THERAPY: Primary | ICD-10-CM

## 2022-02-25 LAB — INR BLD: 2

## 2022-02-25 PROCEDURE — 99211 OFF/OP EST MAY X REQ PHY/QHP: CPT

## 2022-02-25 PROCEDURE — 85610 PROTHROMBIN TIME: CPT

## 2022-02-25 NOTE — PROGRESS NOTES
[address]/[place]  Medication Management  ANTICOAGULATION    Referring Provider: Dr. Na Hinojosa     GOAL INR: 2-3     TODAY'S INR: 2.0     WARFARIN Dosage: Continue warfarin 2 tablets for 5 mg   MWF and whole 2.5 mg tablet all other days. INR (no units)   Date Value   2022 2.2   2021 2.1   10/22/2021 2.1   2021 1.8   2021 2.5   2021 2.1   07/15/2021 1.8       Medication changes:  None    Notes:    Fingerstick INR drawn per clinic protocol. Patient states no visible blood in urine and no black tarry stool. Denies any missed doses of warfarin. No change in other maintenance medications or in diet. Will recheck INR in 6 weeks. Patient acknowledges working in consult agreement with pharmacist as referred by his/her physician.                   For Pharmacy Admin Tracking Only     Intervention Detail: Adherence Monitorin   Total # of Interventions Recommended: 1   Total # of Interventions Accepted: 1   Time Spent (min): 0019 Chrissy Deleon, [address] [facility], PharmD

## 2022-02-25 NOTE — PATIENT INSTRUCTIONS
Continue to monitor urine and stool. Continue to monitor for signs of bleeding. Return to clinic in 6 weeks. Continue warfarin 2 tablets for 5 mg   MWF and whole 2.5 mg tablet all other days.

## 2022-04-12 ENCOUNTER — HOSPITAL ENCOUNTER (OUTPATIENT)
Dept: PHARMACY | Age: 57
Setting detail: THERAPIES SERIES
Discharge: HOME | End: 2022-04-12
Payer: MEDICARE

## 2022-04-12 VITALS
DIASTOLIC BLOOD PRESSURE: 65 MMHG | BODY MASS INDEX: 31.47 KG/M2 | SYSTOLIC BLOOD PRESSURE: 138 MMHG | WEIGHT: 195 LBS | HEART RATE: 64 BPM

## 2022-04-12 DIAGNOSIS — Z79.01 LONG TERM CURRENT USE OF ANTICOAGULANT THERAPY: Primary | ICD-10-CM

## 2022-04-12 DIAGNOSIS — Z95.2 S/P AVR (AORTIC VALVE REPLACEMENT): ICD-10-CM

## 2022-04-12 LAB — INR BLD: 1.9

## 2022-04-12 PROCEDURE — 85610 PROTHROMBIN TIME: CPT

## 2022-04-12 PROCEDURE — 99211 OFF/OP EST MAY X REQ PHY/QHP: CPT

## 2022-04-12 NOTE — PROGRESS NOTES
Asha [address] [place] [address]/[place]  Medication Management  ANTICOAGULATION    Referring Provider: Florinda Park INR: 2-3    TODAY'S INR: 1.9    WARFARIN Dosage: Continue warfarin 5 mg po every MWF: 2.5 mg po all other days    Patient will take warfarin 5 mg po today for 1 dose    INR (no units)   Date Value   2022 1.9   2022 2   2022 2.2   2021 2.1   10/22/2021 2.1   2021 1.8   2021 2.5       Medication changes:  No changes  Notes:    Fingerstick INR drawn per clinic protocol. Patient states no visible blood in urine and no black tarry stool. Denies any missed doses of warfarin. No change in other maintenance medications or in diet. Will recheck INR in 6 weeks. Patient acknowledges working in consult agreement with pharmacist as referred by his/her physician. For Pharmacy Admin Tracking Only     Intervention Detail: Adherence Monitorin and Dose Adjustment: 1, reason: Therapy Optimization   Total # of Interventions Recommended: 1   Total # of Interventions Accepted: 1   Time Spent (min): [address] [facility].  Nevin Renteria [facility]

## 2022-04-27 NOTE — PROGRESS NOTES
I, Claudetta Iha am scribing for and in the presence of Willie Cruz MD.    Patient: Cassandra Moscoso  : 1965  Date of Visit: 2018    REASON FOR VISIT / CONSULTATION: 6 Month Follow-Up (HX: AVR, CAD, Stroke. Pt states she has been doing well other than having trouble breathing in the cold. Denies: CP, palpitations, lightheadedness/dizziness. )      Dear Abbey Reynolds had the pleasure of seeing your patient Cassandra Moscoso in consultation today. Ms. Daphney Fisher is a 48 y.o. female with extensive medical history that includes. 1-History of systemic lupus and antiphospholipid antibody syndrome diagnosed at age 27. 2-History of stroke secondary to hypercoagulable state treated for many years with warfarin, switched to Xarelto (used it for several years until 2018). 3-History of myocardial infarction in , most likely embolic. She has several cardiac catheterizations her most recent one was prior to her aortic valve surgery in 2018, no significant CAD. 4-Bicuspid aortic valve with severe aortic stenosis, S/P [facility]. 5-Long history of smoking and COPD. Currently with chronic hypoxemic respiratory failure on home oxygen therapy. Exercise Tolerance: She reports having a a poor exercise tolerance. Ms. Daphney Fisher says that she her functional capacity is limited by the shortness of breath. .     Ms. Daphney Fisher is here for a follow up. She is doing well, other than having trouble breathing in the cold. She uses a vape every once in a while, but has not smoked otherwise. She goes on a flat treadmill for 6 mph in 15 minutes a couple times a week. Denied any current or recent chest pain, pressure, or tightness, abdominal pain, bleeding problems, problems with her medications or any other concerns at this time. She never hears her mechanical valve. ECG done today (2018) in office: no change from previous.   Sinus [Negative] : Heme/Lymph

## 2022-05-24 ENCOUNTER — HOSPITAL ENCOUNTER (OUTPATIENT)
Dept: PHARMACY | Age: 57
Setting detail: THERAPIES SERIES
Discharge: HOME | End: 2022-05-24
Payer: MEDICARE

## 2022-05-24 VITALS
BODY MASS INDEX: 31.47 KG/M2 | DIASTOLIC BLOOD PRESSURE: 65 MMHG | WEIGHT: 195 LBS | SYSTOLIC BLOOD PRESSURE: 112 MMHG | HEART RATE: 73 BPM

## 2022-05-24 DIAGNOSIS — Z79.01 LONG TERM CURRENT USE OF ANTICOAGULANT THERAPY: Primary | ICD-10-CM

## 2022-05-24 DIAGNOSIS — Z95.2 S/P AVR (AORTIC VALVE REPLACEMENT): ICD-10-CM

## 2022-05-24 LAB — INR BLD: 1.5

## 2022-05-24 PROCEDURE — 85610 PROTHROMBIN TIME: CPT

## 2022-05-24 PROCEDURE — 99212 OFFICE O/P EST SF 10 MIN: CPT

## 2022-05-24 NOTE — PATIENT INSTRUCTIONS
Please take warfarin 7.5 mg today. Increase current dose of warfarin as instructed on dosing calendar provided. Return to clinic in 2 weeks. Continue to monitor urine and stool for signs and symptoms of bleeding. Please notify the clinic of any medication changes. Please remember to bring all medications (both prescription and OTC) to your next visit. Kindly notify the clinic if you are unable to make to your next appointment.

## 2022-05-24 NOTE — PROGRESS NOTES
Asha [address] [place] [address]/[place]  Medication Management  ANTICOAGULATION    Referring Provider: Harlan Mcintyre INR: 2-3    TODAY'S INR: 1.5    WARFARIN Dosage: Increase warfarin to 2.5 mg po every MWF: 5 mg po all other days  Patient will take warfarin 7.5 mg po today for 1 dose    INR (no units)   Date Value   2022 1.5   2022 1.9   2022 2   2022 2.2   2021 2.1   10/22/2021 2.1   2021 1.8       Medication changes:  No changes  Notes:    Fingerstick INR drawn per clinic protocol. Patient states no visible blood in urine and no black tarry stool. Denies any missed doses of warfarin. No change in other maintenance medications or in diet. Will recheck INR in 2 weeks. Patient acknowledges working in consult agreement with pharmacist as referred by his/her physician. For Pharmacy Admin Tracking Only     Intervention Detail: Adherence Monitorin and Dose Adjustment: 2, reason: Therapy Optimization   Total # of Interventions Recommended: 3   Total # of Interventions Accepted: 3   Time Spent (min): [address] [facility] [address]  Phillip Bonilla, [facility]

## 2022-06-09 ENCOUNTER — HOSPITAL ENCOUNTER (OUTPATIENT)
Dept: PHARMACY | Age: 57
Setting detail: THERAPIES SERIES
Discharge: HOME | End: 2022-06-09
Payer: MEDICARE

## 2022-06-09 VITALS
DIASTOLIC BLOOD PRESSURE: 70 MMHG | BODY MASS INDEX: 31.8 KG/M2 | SYSTOLIC BLOOD PRESSURE: 101 MMHG | WEIGHT: 197 LBS | HEART RATE: 75 BPM

## 2022-06-09 DIAGNOSIS — Z95.2 S/P AVR (AORTIC VALVE REPLACEMENT): ICD-10-CM

## 2022-06-09 DIAGNOSIS — Z79.01 LONG TERM CURRENT USE OF ANTICOAGULANT THERAPY: Primary | ICD-10-CM

## 2022-06-09 LAB — INR BLD: 3

## 2022-06-09 PROCEDURE — 85610 PROTHROMBIN TIME: CPT

## 2022-06-09 PROCEDURE — 99211 OFF/OP EST MAY X REQ PHY/QHP: CPT

## 2022-06-09 NOTE — PROGRESS NOTES
Asha [address] [place] [address]/[place]  Medication Management  ANTICOAGULATION    Referring Provider: Jonah Heller     GOAL INR: 2-3     TODAY'S INR: 3.0     WARFARIN Dosage: Decrease warfarin to 2 tablets for 5 mg MWF and 2.5 mg tablet all other days. INR (no units)   Date Value   2022 1.5   2022 1.9   2022 2   2022 2.2   2021 2.1   10/22/2021 2.1   2021 1.8       Medication changes:  none    Notes:    Fingerstick INR drawn per clinic protocol. Patient states no visible blood in urine and no black tarry stool. Denies any missed doses of warfarin. No change in other maintenance medications or in diet. Will recheck INR in 4 weeks. Patient acknowledges working in consult agreement with pharmacist as referred by his/her physician.                   For Pharmacy Admin Tracking Only     Intervention Detail: Adherence Monitorin and Dose Adjustment: 1, reason: Therapy Optimization   Total # of Interventions Recommended: 1   Total # of Interventions Accepted: 1   Time Spent (min): 3470 Chrissy Deleon, [address] [facility], PharmD

## 2022-06-09 NOTE — PATIENT INSTRUCTIONS
Continue to monitor urine and stool. Continue to monitor for signs of bleeding. Return to clinic in 4 weeks. Decrease warfarin to 2 tablets for 5 mg MWF and 2.5 mg tablet all other days.

## 2022-07-07 ENCOUNTER — HOSPITAL ENCOUNTER (OUTPATIENT)
Dept: PHARMACY | Age: 57
Setting detail: THERAPIES SERIES
Discharge: HOME | End: 2022-07-07
Payer: MEDICARE

## 2022-07-07 VITALS
HEART RATE: 70 BPM | WEIGHT: 184 LBS | SYSTOLIC BLOOD PRESSURE: 118 MMHG | BODY MASS INDEX: 29.7 KG/M2 | DIASTOLIC BLOOD PRESSURE: 69 MMHG

## 2022-07-07 DIAGNOSIS — Z79.01 LONG TERM CURRENT USE OF ANTICOAGULANT THERAPY: Primary | ICD-10-CM

## 2022-07-07 DIAGNOSIS — Z95.2 S/P AVR (AORTIC VALVE REPLACEMENT): ICD-10-CM

## 2022-07-07 LAB — INR BLD: 2.5

## 2022-07-07 PROCEDURE — 85610 PROTHROMBIN TIME: CPT

## 2022-07-07 PROCEDURE — 99211 OFF/OP EST MAY X REQ PHY/QHP: CPT

## 2022-07-07 NOTE — PROGRESS NOTES
[employer]/[place]  Medication Management  ANTICOAGULATION    Referring Provider: Maria Luz     GOAL INR: 2-3     TODAY'S INR: 2.5     WARFARIN Dosage: Continue warfarin 2 tablets for 5 mg MWF and whole 2.5 mg tablet all other days. INR (no units)   Date Value   2022 3   2022 1.5   2022 1.9   2022 2   2022 2.2   2021 2.1   10/22/2021 2.1       Medication changes:  none    Notes:    Fingerstick INR drawn per clinic protocol. Patient states no visible blood in urine and no black tarry stool. Denies any missed doses of warfarin. No change in other maintenance medications or in diet. Will recheck INR in 6 weeks. Patient acknowledges working in consult agreement with pharmacist as referred by his/her physician.                   For Pharmacy Admin Tracking Only     Intervention Detail: Adherence Monitorin   Total # of Interventions Recommended: 0   Total # of Interventions Accepted: 0   Time Spent (min):  Chrissy Deleon, [facility], PharmD

## 2022-08-18 ENCOUNTER — HOSPITAL ENCOUNTER (OUTPATIENT)
Dept: PHARMACY | Age: 57
Setting detail: THERAPIES SERIES
Discharge: HOME | End: 2022-08-18
Payer: MEDICARE

## 2022-08-18 VITALS — DIASTOLIC BLOOD PRESSURE: 55 MMHG | SYSTOLIC BLOOD PRESSURE: 103 MMHG | BODY MASS INDEX: 32.35 KG/M2 | WEIGHT: 200.4 LBS

## 2022-08-18 DIAGNOSIS — Z79.01 LONG TERM CURRENT USE OF ANTICOAGULANT THERAPY: Primary | ICD-10-CM

## 2022-08-18 DIAGNOSIS — Z95.2 S/P AVR (AORTIC VALVE REPLACEMENT): ICD-10-CM

## 2022-08-18 LAB — INR BLD: 1.7

## 2022-08-18 PROCEDURE — 99212 OFFICE O/P EST SF 10 MIN: CPT | Performed by: FAMILY MEDICINE

## 2022-08-18 PROCEDURE — 85610 PROTHROMBIN TIME: CPT | Performed by: FAMILY MEDICINE

## 2022-08-18 NOTE — PATIENT INSTRUCTIONS
Please take 2 tablets (5mg) warfarin today then resume previously stable dosing of 5mg (2 tablets) Monday, Wednesday and Friday and 2.5mg (1 tablet) all other days. Continue to monitor for signs of bleeding. Return to coumadin clinic in 3 weeks.

## 2022-08-18 NOTE — PROGRESS NOTES
Asha [address] [place] [address]/[place]  Medication Management  ANTICOAGULATION    Referring Provider: Dr Karissa Ortiz INR: 2.0-3.0    TODAY'S INR: 1.7    WARFARIN Dosage: 5mg x1 then resume 5mg MWF, 2.5mg all other days    INR (no units)   Date Value   2022 1.7   2022 2.5   2022 3   2022 1.5   2022 1.9   2022 2   2022 2.2       Medication changes:  No changes  Notes:    Fingerstick INR drawn per clinic protocol. Patient states no visible blood in urine and no black tarry stool. Denies any missed doses of warfarin. No change in other maintenance medications or in diet. Will recheck INR in 6 weeks. Patient will take warfarin 5mg today then resume previously stable dosing. Patient acknowledges working in consult agreement with pharmacist as referred by his/her physician.                   For Pharmacy Admin Tracking Only    Intervention Detail: Adherence Monitorin and Dose Adjustment: 1, reason: Therapy Optimization  Total # of Interventions Recommended: 3  Total # of Interventions Accepted: 3  Time Spent (min): 20      FABIAN Oliva.Ph., 2022,11:25 AM

## 2022-09-08 ENCOUNTER — HOSPITAL ENCOUNTER (OUTPATIENT)
Dept: PHARMACY | Age: 57
Setting detail: THERAPIES SERIES
Discharge: HOME | End: 2022-09-08
Payer: MEDICARE

## 2022-09-08 VITALS
SYSTOLIC BLOOD PRESSURE: 139 MMHG | HEART RATE: 67 BPM | DIASTOLIC BLOOD PRESSURE: 67 MMHG | WEIGHT: 203.4 LBS | BODY MASS INDEX: 32.83 KG/M2

## 2022-09-08 DIAGNOSIS — Z95.2 S/P AVR (AORTIC VALVE REPLACEMENT): ICD-10-CM

## 2022-09-08 DIAGNOSIS — Z79.01 LONG TERM CURRENT USE OF ANTICOAGULANT THERAPY: Primary | ICD-10-CM

## 2022-09-08 LAB — INR BLD: 3.9

## 2022-09-08 PROCEDURE — 85610 PROTHROMBIN TIME: CPT | Performed by: FAMILY MEDICINE

## 2022-09-08 PROCEDURE — 99212 OFFICE O/P EST SF 10 MIN: CPT | Performed by: FAMILY MEDICINE

## 2022-09-08 NOTE — PATIENT INSTRUCTIONS
Please do not take warfarin today. Please do not take warfarin on Saturday. Take 5mg warfarin on Friday and 2.5mg warfarin on Sunday. Continue to monitor for signs of bleeding. Please call coumadin clinic on Monday after appt with Dr Faraz Kendrick.

## 2022-09-08 NOTE — PROGRESS NOTES
[address]/[place]  Medication Management  ANTICOAGULATION    Referring Provider: Dr Ina Gordillo INR: 2.0-3.0    TODAY'S INR: 3.9    WARFARIN Dosage: see note, 5mg MW, 2.5mg all other days    INR (no units)   Date Value   2022 3.9   2022 1.7   2022 2.5   2022 3   2022 1.5   2022 1.9   2022 2       Medication changes:  Currently taking APAP 1000mg every 4 hours ATC for last 5 days    Notes:    Fingerstick INR drawn per clinic protocol. Patient states no visible blood in urine and no black tarry stool. Denies any missed doses of warfarin. No change in other maintenance medications or in diet. Will speak with Delphine Chamberlain in 4 days for telephone visit after appointment with Dr Mariajose Dudley. Patient injured her back this past weekend and has been taking Tylenol ES 2 tablets every 4 hours around the clock since that time. Patient will hold warfarin today and Saturday as she plans to continue to take tylenol until appt Monday. Patient will take \"regular\" doses of warfarin on Friday and . Delphine Chamberlain will plan to contact our clinic after her appt to provide update on medications and plan from Dr Mariajose Dudley. Clinic will follow up in afternoon if we have not heard from her. Patient acknowledges working in consult agreement with pharmacist as referred by his/her physician.                   For Pharmacy Admin Tracking Only    Intervention Detail: Adherence Monitorin and Dose Adjustment: 2, reason: Therapy De-escalation  Total # of Interventions Recommended: 4  Total # of Interventions Accepted: 4  Time Spent (min):  25    FABIAN Gonzalez.Ph., 2022,1:08 PM

## 2022-09-12 ENCOUNTER — HOSPITAL ENCOUNTER (OUTPATIENT)
Dept: PHARMACY | Age: 57
Setting detail: THERAPIES SERIES
End: 2022-09-12
Payer: MEDICARE

## 2022-09-12 ENCOUNTER — ANTI-COAG VISIT (OUTPATIENT)
Dept: PHARMACY | Age: 57
End: 2022-09-12

## 2022-09-12 DIAGNOSIS — Z95.2 S/P AVR (AORTIC VALVE REPLACEMENT): ICD-10-CM

## 2022-09-12 DIAGNOSIS — Z79.01 LONG TERM CURRENT USE OF ANTICOAGULANT THERAPY: Primary | ICD-10-CM

## 2022-09-12 NOTE — PROGRESS NOTES
I attempted to call patient twice today with no answer. Patient called back and stated she followed up with Dr Leigh Pitt today regarding back pain. She was started on Tramadol which should decrease her need for Tylenol. Patient will resume her previous warfarin dosage of 2 tablets for 5 mg on  and whole 2.5 mg tablet all other days. Return to clinic on Thursday 9-15-22.     For Pharmacy Admin Tracking Only    Intervention Detail: Adherence Monitorin and New Rx: 1, reason: Needs Additional Therapy  Total # of Interventions Recommended: 1  Total # of Interventions Accepted: 1  Time Spent (min): 20    Arianna GriffithD 2022 3:41 PM

## 2022-09-15 ENCOUNTER — HOSPITAL ENCOUNTER (OUTPATIENT)
Dept: PHARMACY | Age: 57
Setting detail: THERAPIES SERIES
Discharge: HOME | End: 2022-09-15
Payer: MEDICARE

## 2022-09-15 VITALS
WEIGHT: 193.3 LBS | BODY MASS INDEX: 31.2 KG/M2 | SYSTOLIC BLOOD PRESSURE: 112 MMHG | DIASTOLIC BLOOD PRESSURE: 67 MMHG | HEART RATE: 65 BPM

## 2022-09-15 DIAGNOSIS — Z95.2 S/P AVR (AORTIC VALVE REPLACEMENT): ICD-10-CM

## 2022-09-15 DIAGNOSIS — Z79.01 LONG TERM CURRENT USE OF ANTICOAGULANT THERAPY: Primary | ICD-10-CM

## 2022-09-15 LAB — INR BLD: 2.5

## 2022-09-15 PROCEDURE — 85610 PROTHROMBIN TIME: CPT | Performed by: FAMILY MEDICINE

## 2022-09-15 PROCEDURE — 99211 OFF/OP EST MAY X REQ PHY/QHP: CPT | Performed by: FAMILY MEDICINE

## 2022-09-15 RX ORDER — TRAMADOL HYDROCHLORIDE 50 MG/1
50 TABLET ORAL 4 TIMES DAILY
COMMUNITY
Start: 2022-09-12

## 2022-09-15 NOTE — PROGRESS NOTES
Asha [address] [place] [address]/[place]  Medication Management  ANTICOAGULATION    Referring Provider: Dr Larisa Whipple INR: 2.0-3.0    TODAY'S INR: 2.5    WARFARIN Dosage: continue 5mg MWf, 2.5mg all other days    INR (no units)   Date Value   09/15/2022 2.5   2022 3.9   2022 1.7   2022 2.5   2022 3   2022 1.5   2022 1.9       Medication changes:  Tramadol QID and weaning    Notes:    Fingerstick INR drawn per clinic protocol. Patient states no visible blood in urine and no black tarry stool. Denies any missed doses of warfarin. No change in other maintenance medications or in diet. Will recheck INR in 6 weeks. Back pain is improving and Tim Dunaway hopeful to be finished with tramadol in next few days to week. Patient acknowledges working in consult agreement with pharmacist as referred by his/her physician.                   For Pharmacy Admin Tracking Only    Intervention Detail: Adherence Monitorin  Total # of Interventions Recommended: 2  Total # of Interventions Accepted: 2  Time Spent (min): 20    FABIAN Cuellar.Silvana., 9/15/2022,11:47 AM

## 2022-10-27 ENCOUNTER — HOSPITAL ENCOUNTER (OUTPATIENT)
Dept: PHARMACY | Age: 57
Setting detail: THERAPIES SERIES
Discharge: HOME | End: 2022-10-27
Payer: MEDICARE

## 2022-10-27 VITALS
WEIGHT: 195 LBS | SYSTOLIC BLOOD PRESSURE: 118 MMHG | BODY MASS INDEX: 31.47 KG/M2 | DIASTOLIC BLOOD PRESSURE: 68 MMHG | HEART RATE: 71 BPM

## 2022-10-27 DIAGNOSIS — Z95.2 S/P AVR (AORTIC VALVE REPLACEMENT): ICD-10-CM

## 2022-10-27 DIAGNOSIS — Z79.01 LONG TERM CURRENT USE OF ANTICOAGULANT THERAPY: Primary | ICD-10-CM

## 2022-10-27 LAB — INR BLD: 2.5

## 2022-10-27 PROCEDURE — 99211 OFF/OP EST MAY X REQ PHY/QHP: CPT

## 2022-10-27 PROCEDURE — 85610 PROTHROMBIN TIME: CPT

## 2022-10-27 RX ORDER — METHOCARBAMOL 750 MG/1
750 TABLET, FILM COATED ORAL 3 TIMES DAILY
COMMUNITY
Start: 2022-09-16

## 2022-10-27 NOTE — PROGRESS NOTES
[address]/[place]  Medication Management  ANTICOAGULATION    Referring Provider: Dr Dean Lay INR: 2.0-3.0     TODAY'S INR: 2.5     WARFARIN Dosage: Continue warfarin 2 tablets for 5 mg MWF and whole 2.5 mg tablet all other days. INR (no units)   Date Value   09/15/2022 2.5   2022 3.9   2022 1.7   2022 2.5   2022 3   2022 1.5   2022 1.9       Medication changes:  Stopped Zonegran    Notes:    Fingerstick INR drawn per clinic protocol. Patient states no visible blood in urine and no black tarry stool. Denies any missed doses of warfarin. No change in other maintenance medications or in diet. Will recheck INR in 6 weeks. Patient acknowledges working in consult agreement with pharmacist as referred by his/her physician.                   For Pharmacy Admin Tracking Only    Intervention Detail: Adherence Monitorin and New Rx: 1, reason: Needs Additional Therapy  Total # of Interventions Recommended: 1  Total # of Interventions Accepted: 1  Time Spent (min): Brittanie Nash 11, [facility], PharmD

## 2022-10-27 NOTE — PATIENT INSTRUCTIONS
Continue to monitor urine and stool. Continue to monitor for signs of bleeding. Return to clinic in 4 weeks. Continue warfarin 2 tablets for 5 mg MWF and whole 2.5 mg tablet all other days.

## 2022-12-08 ENCOUNTER — HOSPITAL ENCOUNTER (OUTPATIENT)
Dept: PHARMACY | Age: 57
Setting detail: THERAPIES SERIES
Discharge: HOME | End: 2022-12-08
Payer: MEDICARE

## 2022-12-08 VITALS
DIASTOLIC BLOOD PRESSURE: 73 MMHG | HEART RATE: 68 BPM | WEIGHT: 195 LBS | SYSTOLIC BLOOD PRESSURE: 107 MMHG | BODY MASS INDEX: 31.47 KG/M2

## 2022-12-08 DIAGNOSIS — Z95.2 S/P AVR (AORTIC VALVE REPLACEMENT): ICD-10-CM

## 2022-12-08 DIAGNOSIS — Z79.01 LONG TERM CURRENT USE OF ANTICOAGULANT THERAPY: Primary | ICD-10-CM

## 2022-12-08 LAB — INR BLD: 2.7

## 2022-12-08 PROCEDURE — 85610 PROTHROMBIN TIME: CPT | Performed by: FAMILY MEDICINE

## 2022-12-08 PROCEDURE — 99211 OFF/OP EST MAY X REQ PHY/QHP: CPT | Performed by: FAMILY MEDICINE

## 2022-12-08 NOTE — PROGRESS NOTES
Asha [address] [place] [address]/[place]  Medication Management  ANTICOAGULATION    Referring Provider: Dr Moi Ferreira INR: 2.0-3.0    TODAY'S INR: 2.7    WARFARIN Dosage: continue 5mg MWF, 2.5mg all other days    INR (no units)   Date Value   2022 2.7   10/27/2022 2.5   09/15/2022 2.5   2022 3.9   2022 1.7   2022 2.5   2022 3       Medication changes:  No changes  Notes:    Fingerstick INR drawn per clinic protocol. Patient states no visible blood in urine and no black tarry stool. Denies any missed doses of warfarin. No change in other maintenance medications or in diet. Will recheck INR in 6 weeks. Patient acknowledges working in consult agreement with pharmacist as referred by his/her physician.                   For Pharmacy Admin Tracking Only    Intervention Detail: Adherence Monitorin  Total # of Interventions Recommended: 2  Total # of Interventions Accepted: 2  Time Spent (min): 20    FABIAN Peres.Ph., 2022,11:18 AM

## 2022-12-08 NOTE — PATIENT INSTRUCTIONS
Please continue to take warfarin 5mg (2 tablets) on Monday, Wednesday, and Friday. Continue to monitor for signs of bleeding. Return to coumadin clinic in 6 weeks.

## 2023-01-19 ENCOUNTER — HOSPITAL ENCOUNTER (OUTPATIENT)
Dept: PHARMACY | Age: 58
Setting detail: THERAPIES SERIES
Discharge: HOME | End: 2023-01-19

## 2023-01-19 VITALS
DIASTOLIC BLOOD PRESSURE: 65 MMHG | SYSTOLIC BLOOD PRESSURE: 120 MMHG | BODY MASS INDEX: 30.99 KG/M2 | WEIGHT: 192 LBS | HEART RATE: 65 BPM

## 2023-01-19 DIAGNOSIS — Z95.2 S/P AVR (AORTIC VALVE REPLACEMENT): ICD-10-CM

## 2023-01-19 DIAGNOSIS — Z79.01 LONG TERM CURRENT USE OF ANTICOAGULANT THERAPY: Primary | ICD-10-CM

## 2023-01-19 LAB — INR BLD: 2.2

## 2023-01-19 PROCEDURE — 99211 OFF/OP EST MAY X REQ PHY/QHP: CPT | Performed by: FAMILY MEDICINE

## 2023-01-19 PROCEDURE — 85610 PROTHROMBIN TIME: CPT | Performed by: FAMILY MEDICINE

## 2023-01-19 NOTE — PROGRESS NOTES
[address]/[place]  Medication Management  ANTICOAGULATION    Referring Provider: Dr Tricia Sun INR: 2.0-3.0    TODAY'S INR: 2.2    WARFARIN Dosage: continue 5mg MWF, 2.5mg all other days    INR (no units)   Date Value   2023 2.2   2022 2.7   10/27/2022 2.5   09/15/2022 2.5   2022 3.9   2022 1.7   2022 2.5       Medication changes:  No changes    Notes:    Fingerstick INR drawn per clinic protocol. Patient states no visible blood in urine and no black tarry stool. Denies any missed doses of warfarin. No change in other maintenance medications or in diet. Will recheck INR in 6 weeks. Patient acknowledges working in consult agreement with pharmacist as referred by his/her physician.                   For Pharmacy Admin Tracking Only    Intervention Detail: Adherence Monitorin  Total # of Interventions Recommended: 2  Total # of Interventions Accepted: 2  Time Spent (min): 20    FABIAN Lara.Ph., 2023,12:09 PM

## 2023-01-19 NOTE — PATIENT INSTRUCTIONS
Continue to take warfarin 5mg (2 tablets) on Monday, Wednesday and Friday and 2.5mg (1 tablet) all other days. Continue to monitor for signs of bleeding. Return to coumadin clinic in 6 weeks.

## 2023-03-02 ENCOUNTER — HOSPITAL ENCOUNTER (OUTPATIENT)
Dept: PHARMACY | Age: 58
Setting detail: THERAPIES SERIES
Discharge: HOME | End: 2023-03-02
Payer: MEDICARE

## 2023-03-02 VITALS
HEART RATE: 73 BPM | DIASTOLIC BLOOD PRESSURE: 69 MMHG | SYSTOLIC BLOOD PRESSURE: 114 MMHG | WEIGHT: 192 LBS | BODY MASS INDEX: 30.99 KG/M2

## 2023-03-02 DIAGNOSIS — Z95.2 S/P AVR (AORTIC VALVE REPLACEMENT): ICD-10-CM

## 2023-03-02 DIAGNOSIS — Z79.01 LONG TERM CURRENT USE OF ANTICOAGULANT THERAPY: Primary | ICD-10-CM

## 2023-03-02 LAB — INR BLD: 2

## 2023-03-02 PROCEDURE — 99211 OFF/OP EST MAY X REQ PHY/QHP: CPT

## 2023-03-02 PROCEDURE — 85610 PROTHROMBIN TIME: CPT

## 2023-03-02 RX ORDER — ZONISAMIDE 100 MG/1
200 CAPSULE ORAL
COMMUNITY
Start: 2023-01-04

## 2023-03-02 NOTE — PROGRESS NOTES
[address]/[place]  Medication Management  ANTICOAGULATION    Referring Provider: Dr Yonny Gaming INR: 2 - 3     TODAY'S INR: 2     WARFARIN Dosage: Continue warfarin 2 tablets for 5 mg MWF and whole 2.5 mg tablet all other days. INR (no units)   Date Value   2023 2.2   2022 2.7   10/27/2022 2.5   09/15/2022 2.5   2022 3.9   2022 1.7   2022 2.5     Medication changes:  none    Notes:    Fingerstick INR drawn per clinic protocol. Patient states no visible blood in urine and no black tarry stool. Denies any missed doses of warfarin. No change in other maintenance medications or in diet. Will recheck INR in 6 weeks. Patient acknowledges working in consult agreement with pharmacist as referred by his/her physician. Patient is [facility] tomorrow. She had breast CA previously so [place] does her mammograms.             For Pharmacy Admin Tracking Only    Intervention Detail: Adherence Monitorin  Total # of Interventions Recommended: 0  Total # of Interventions Accepted: 0  Time Spent (min): Brittanie Nash 11, [facility], PharmD

## 2023-05-11 ENCOUNTER — HOSPITAL ENCOUNTER (OUTPATIENT)
Dept: PHARMACY | Age: 58
Setting detail: THERAPIES SERIES
Discharge: HOME | End: 2023-05-11
Payer: COMMERCIAL

## 2023-05-11 VITALS
DIASTOLIC BLOOD PRESSURE: 70 MMHG | HEART RATE: 71 BPM | SYSTOLIC BLOOD PRESSURE: 116 MMHG | BODY MASS INDEX: 30.99 KG/M2 | WEIGHT: 192 LBS

## 2023-05-11 DIAGNOSIS — Z95.2 S/P AVR (AORTIC VALVE REPLACEMENT): ICD-10-CM

## 2023-05-11 DIAGNOSIS — Z79.01 LONG TERM CURRENT USE OF ANTICOAGULANT THERAPY: Primary | ICD-10-CM

## 2023-05-11 LAB — INR BLD: 3.1

## 2023-05-11 PROCEDURE — 85610 PROTHROMBIN TIME: CPT

## 2023-05-11 PROCEDURE — 99212 OFFICE O/P EST SF 10 MIN: CPT

## 2023-05-11 NOTE — PROGRESS NOTES
Asha [address] [place] [address]/[place]  Medication Management  ANTICOAGULATION    Referring Provider: Xuan Olvera CNP     GOAL INR: 2 - 3     TODAY'S INR: 3.1     WARFARIN Dosage: Hold warfarin today then continue warfarin 2 tablets for 5 mg MWF and whole 2.5 mg tablet all other days. INR (no units)   Date Value   2023 1.8   2023 2   2023 2.2   2022 2.7   10/27/2022 2.5   09/15/2022 2.5   2022 3.9       Hemoglobin   Date Value Ref Range Status   2021 10.3 (L) 11.9 - 15.1 g/dL Final     Hematocrit   Date Value Ref Range Status   2021 33.8 (L) 36.3 - 47.1 % Final     ALT   Date Value Ref Range Status   2021 14 5 - 33 U/L Final     AST   Date Value Ref Range Status   2021 21 <32 U/L Final       Medication changes:  Stopped Tramadol  INcreased Tylenol usage with new false teeth    Notes:    Fingerstick INR drawn per clinic protocol. Patient states no visible blood in urine and no black tarry stool. Denies any missed doses of warfarin. No change in other maintenance medications or in diet. Will recheck INR in 4 weeks. Patient acknowledges working in consult agreement with pharmacist as referred by his/her physician. Patient has been eating less and taking more Tylenol with new false teeth. Patient took Ultram when she first started with her false teeth due to the pain but now is just using Tylenol and is tapering the usage with getting used to her teeth and having them adjusted multiple times.            For Pharmacy Admin Tracking Only    Intervention Detail: Adherence Monitorin, Dose Adjustment: 1, reason: Therapy Optimization, and New Rx: 2, reason: Needs Additional Therapy, Patient Preference  Total # of Interventions Recommended: 3  Total # of Interventions Accepted: 3  Time Spent (min): Brittanie Mcgovern, [facility], PharmD Tetracycline Counseling: Patient counseled regarding possible photosensitivity and increased risk for sunburn.  Patient instructed to avoid sunlight, if possible.  When exposed to sunlight, patients should wear protective clothing, sunglasses, and sunscreen.  The patient was instructed to call the office immediately if the following severe adverse effects occur:  hearing changes, easy bruising/bleeding, severe headache, or vision changes.  The patient verbalized understanding of the proper use and possible adverse effects of tetracycline.  All of the patient's questions and concerns were addressed. Patient understands to avoid pregnancy while on therapy due to potential birth defects.

## 2023-06-08 ENCOUNTER — HOSPITAL ENCOUNTER (OUTPATIENT)
Dept: PHARMACY | Age: 58
Setting detail: THERAPIES SERIES
Discharge: HOME | End: 2023-06-08
Payer: COMMERCIAL

## 2023-06-08 VITALS
SYSTOLIC BLOOD PRESSURE: 129 MMHG | WEIGHT: 195 LBS | DIASTOLIC BLOOD PRESSURE: 58 MMHG | BODY MASS INDEX: 31.47 KG/M2 | HEART RATE: 77 BPM

## 2023-06-08 DIAGNOSIS — Z79.01 LONG TERM CURRENT USE OF ANTICOAGULANT THERAPY: Primary | ICD-10-CM

## 2023-06-08 DIAGNOSIS — Z95.2 S/P AVR (AORTIC VALVE REPLACEMENT): ICD-10-CM

## 2023-06-08 LAB — INR BLD: 2.4

## 2023-06-08 PROCEDURE — 85610 PROTHROMBIN TIME: CPT

## 2023-06-08 PROCEDURE — 99212 OFFICE O/P EST SF 10 MIN: CPT

## 2023-06-08 NOTE — PROGRESS NOTES
Asha [address] [facility]/[place]  Medication Management  ANTICOAGULATION    Referring Provider: Vamshi Hamilton CNP     GOAL INR: 2 - 3     TODAY'S INR: 2.4     WARFARIN Dosage: Continue warfarin 2 tablets for 5 mg MWF and whole 2.5 mg tablet all other days. INR (no units)   Date Value   2023 3.1   2023 1.8   2023 2   2023 2.2   2022 2.7   10/27/2022 2.5   09/15/2022 2.5       Hemoglobin   Date Value Ref Range Status   2021 10.3 (L) 11.9 - 15.1 g/dL Final     Hematocrit   Date Value Ref Range Status   2021 33.8 (L) 36.3 - 47.1 % Final     ALT   Date Value Ref Range Status   2021 14 5 - 33 U/L Final     AST   Date Value Ref Range Status   2021 21 <32 U/L Final       Medication changes:  none    Notes:    Fingerstick INR drawn per clinic protocol. Patient states no visible blood in urine and no black tarry stool. Denies any missed doses of warfarin. No change in other maintenance medications or in diet. Will recheck INR in 6 weeks. Patient acknowledges working in consult agreement with pharmacist as referred by his/her physician. Called new RX into [address] [place] [address] ([phone]) voicemail for warfarin 2.5 mg tablet take 2 tablets for 5 mg on MWF and 1 tablet for 2.5 mg all other days with 90 day supply and 3 refills per Michelle Carbone.         For Pharmacy Admin Tracking Only    Intervention Detail: Adherence Monitorin and New Rx: 1, reason: Needs Additional Therapy  Total # of Interventions Recommended: 1  Total # of Interventions Accepted: 1  Time Spent (min): Brittanie Mcgovern, [facility], PharmD

## 2023-07-20 ENCOUNTER — HOSPITAL ENCOUNTER (OUTPATIENT)
Dept: PHARMACY | Age: 58
Setting detail: THERAPIES SERIES
Discharge: HOME | End: 2023-07-20
Payer: COMMERCIAL

## 2023-07-20 VITALS
DIASTOLIC BLOOD PRESSURE: 60 MMHG | SYSTOLIC BLOOD PRESSURE: 117 MMHG | BODY MASS INDEX: 30.83 KG/M2 | WEIGHT: 191 LBS | HEART RATE: 64 BPM

## 2023-07-20 DIAGNOSIS — Z95.2 S/P AVR (AORTIC VALVE REPLACEMENT): ICD-10-CM

## 2023-07-20 DIAGNOSIS — Z79.01 LONG TERM CURRENT USE OF ANTICOAGULANT THERAPY: Primary | ICD-10-CM

## 2023-07-20 LAB — INR BLD: 3.2

## 2023-07-20 PROCEDURE — 99212 OFFICE O/P EST SF 10 MIN: CPT

## 2023-07-20 PROCEDURE — 85610 PROTHROMBIN TIME: CPT

## 2023-07-20 NOTE — PATIENT INSTRUCTIONS
Continue to monitor urine and stool for signs and symptoms of bleeding. Please notify the clinic of any medication changes. Please remember to bring all medications (both prescription and OTC) to your next visit. Kindly notify the clinic if you are unable to make to your next appointment. Decrease current dose of warfarin as instructed on dosing calendar provided.

## 2023-07-20 NOTE — PROGRESS NOTES
[address] [facility]/[place]  Medication Management  ANTICOAGULATION    Referring Provider: Stella Luther CNP     GOAL INR: 2 - 3     TODAY'S INR: 3.2     WARFARIN Dosage: Hold warfarin today then decrease warfarin to 2 tablets for 5 mg MF and whole 2.5 mg tablet all other days. INR (no units)   Date Value   2023 2.4   2023 3.1   2023 1.8   2023 2   2023 2.2   2022 2.7   10/27/2022 2.5       Hemoglobin   Date Value Ref Range Status   2021 10.3 (L) 11.9 - 15.1 g/dL Final     Hematocrit   Date Value Ref Range Status   2021 33.8 (L) 36.3 - 47.1 % Final     ALT   Date Value Ref Range Status   2021 14 5 - 33 U/L Final     AST   Date Value Ref Range Status   2021 21 <32 U/L Final       Medication changes:  Stopped Robaxin  Increased Prilosec    Notes:    Fingerstick INR drawn per clinic protocol. Patient states no visible blood in urine and no black tarry stool. Denies any missed doses of warfarin. No change in other maintenance medications or in diet. Will recheck INR in 4 weeks. Patient acknowledges working in consult agreement with pharmacist as referred by his/her physician. Patient had some diarrhea that can increase INR and also increased Prilosec.             For Pharmacy Admin Tracking Only    Intervention Detail: Adherence Monitorin, Dose Adjustment: 2, reason: Therapy Optimization, and New Rx: 2, reason: Needs Additional Therapy, Patient Preference  Total # of Interventions Recommended: 4  Total # of Interventions Accepted: 4  Time Spent (min): [address] [facility], PharmD

## 2023-08-18 ENCOUNTER — HOSPITAL ENCOUNTER (OUTPATIENT)
Dept: PHARMACY | Age: 58
Setting detail: THERAPIES SERIES
Discharge: HOME | End: 2023-08-18
Payer: COMMERCIAL

## 2023-08-18 VITALS
WEIGHT: 187.2 LBS | BODY MASS INDEX: 30.21 KG/M2 | DIASTOLIC BLOOD PRESSURE: 70 MMHG | SYSTOLIC BLOOD PRESSURE: 127 MMHG | HEART RATE: 70 BPM

## 2023-08-18 DIAGNOSIS — Z95.2 S/P AVR (AORTIC VALVE REPLACEMENT): ICD-10-CM

## 2023-08-18 DIAGNOSIS — Z79.01 LONG TERM CURRENT USE OF ANTICOAGULANT THERAPY: Primary | ICD-10-CM

## 2023-08-18 LAB — INR BLD: 1.7

## 2023-08-18 PROCEDURE — 99212 OFFICE O/P EST SF 10 MIN: CPT | Performed by: FAMILY MEDICINE

## 2023-08-18 PROCEDURE — 85610 PROTHROMBIN TIME: CPT | Performed by: FAMILY MEDICINE

## 2023-08-18 NOTE — PROGRESS NOTES
[facility]  Medication Management  ANTICOAGULATION    Referring Provider: KINGSLEY Carbone CNP    GOAL INR: 2.0-3.0    TODAY'S INR: 1.7    WARFARIN Dosage: 7.5mg x1 then resume 5mg MWF, 2.5mg all other days    INR (no units)   Date Value   2023 1.7   2023 3.2   2023 2.4   2023 3.1   2023 1.8   2023 2   2023 2.2       Hemoglobin   Date Value Ref Range Status   2021 10.3 (L) 11.9 - 15.1 g/dL Final     Hematocrit   Date Value Ref Range Status   2021 33.8 (L) 36.3 - 47.1 % Final     ALT   Date Value Ref Range Status   2021 14 5 - 33 U/L Final     AST   Date Value Ref Range Status   2021 21 <32 U/L Final       Medication changes:  No change    Notes:    Fingerstick INR drawn per clinic protocol. Patient states no visible blood in urine and no black tarry stool. Denies any missed doses of warfarin. No change in other maintenance medications or in diet. Will recheck INR in 4 weeks. Patient's son passed away several days ago unexpectedly. Patient states she has not eaten well, not slept well since his death. Patient states she is proud of him being an organ donor (which she was unaware) but it has been very difficult to process not only his death but the impact being a donor has been. Patient may have missed a dose of warfarin but does not believe she has done so. Patient will take warfarin 7.5mg today then resume previously stable dosing Patient acknowledges working in consult agreement with pharmacist as referred by his/her physician.                   For Pharmacy Admin Tracking Only    Intervention Detail: Adherence Monitorin and Dose Adjustment: 1, reason: Therapy Optimization  Total # of Interventions Recommended: 3  Total # of Interventions Accepted: 3  Time Spent (min):  25    Freedom Farrar R.Ph., 2023,2:59 PM

## 2023-08-18 NOTE — PATIENT INSTRUCTIONS
Please take 7.5mg (3 tablets) warfarin today then resume your regular dosing of 5mg (2 tablets) Monday Wednesday and Friday and 2.5mg (1 tablet) all other days. Continue to monitor urine and stool for signs and symptoms of bleeding. Please notify the clinic of any medication changes. Please remember to bring all medications (both prescription and OTC) to your next visit. Kindly notify the clinic if you are unable to make to your next appointment. Continue current dose of warfarin as instructed on dosing calendar provided.

## 2023-09-18 ENCOUNTER — HOSPITAL ENCOUNTER (OUTPATIENT)
Dept: PHARMACY | Age: 58
Setting detail: THERAPIES SERIES
Discharge: HOME | End: 2023-09-18
Payer: COMMERCIAL

## 2023-09-18 VITALS
SYSTOLIC BLOOD PRESSURE: 104 MMHG | WEIGHT: 183 LBS | HEART RATE: 68 BPM | BODY MASS INDEX: 29.54 KG/M2 | DIASTOLIC BLOOD PRESSURE: 61 MMHG

## 2023-09-18 DIAGNOSIS — Z79.01 LONG TERM CURRENT USE OF ANTICOAGULANT THERAPY: Primary | ICD-10-CM

## 2023-09-18 DIAGNOSIS — Z95.2 S/P AVR (AORTIC VALVE REPLACEMENT): ICD-10-CM

## 2023-09-18 LAB — INR BLD: 4.1

## 2023-09-18 PROCEDURE — 99212 OFFICE O/P EST SF 10 MIN: CPT

## 2023-09-18 PROCEDURE — 85610 PROTHROMBIN TIME: CPT

## 2023-09-18 RX ORDER — IPRATROPIUM BROMIDE AND ALBUTEROL SULFATE 2.5; .5 MG/3ML; MG/3ML
SOLUTION RESPIRATORY (INHALATION) EVERY 6 HOURS PRN
COMMUNITY
Start: 2023-08-30

## 2023-09-18 NOTE — PROGRESS NOTES
[facility]  Medication Management  ANTICOAGULATION    Referring Provider: KINGSLEY Carbone CNP     GOAL INR: 2 - 3     TODAY'S INR: 4.1     WARFARIN Dosage: Hold warfarin for 2 days then continue warfarin 5 mg MWF and 2.5 mg all other days. INR (no units)   Date Value   2023 1.7   2023 3.2   2023 2.4   2023 3.1   2023 1.8   2023 2   2023 2.2       Hemoglobin   Date Value Ref Range Status   2021 10.3 (L) 11.9 - 15.1 g/dL Final     Hematocrit   Date Value Ref Range Status   2021 33.8 (L) 36.3 - 47.1 % Final     ALT   Date Value Ref Range Status   2021 14 5 - 33 U/L Final     AST   Date Value Ref Range Status   2021 21 <32 U/L Final       Medication changes:  Just finished Augmentin,   Finished Diflucan   Finished Medrol dose jack  Stopping Fosamax  Starting Prolia    Notes:    Fingerstick INR drawn per clinic protocol. Patient states no visible blood in urine and no black tarry stool. Denies any missed doses of warfarin. No change in other maintenance medications or in diet. Will recheck INR in 2 weeks. Patient acknowledges working in consult agreement with pharmacist as referred by his/her physician. Patient is losing weight but she is NOT trying to lose. Son committed suicide a month ago. He was bipolar and hung himself. So she is losing weight from stress.           For Pharmacy Admin Tracking Only    Intervention Detail: Adherence Monitorin, Dose Adjustment: 2, reason: Therapy Optimization, and New Rx: 5, reason: Needs Additional Therapy  Total # of Interventions Recommended: 7  Total # of Interventions Accepted: 7  Time Spent (min): [address], [address] [facility], PharmD

## 2023-10-04 ENCOUNTER — HOSPITAL ENCOUNTER (OUTPATIENT)
Dept: PHARMACY | Age: 58
Setting detail: THERAPIES SERIES
Discharge: HOME | End: 2023-10-04
Payer: COMMERCIAL

## 2023-10-04 VITALS
HEART RATE: 66 BPM | SYSTOLIC BLOOD PRESSURE: 119 MMHG | WEIGHT: 187 LBS | BODY MASS INDEX: 30.18 KG/M2 | DIASTOLIC BLOOD PRESSURE: 67 MMHG

## 2023-10-04 DIAGNOSIS — Z95.2 S/P AVR (AORTIC VALVE REPLACEMENT): ICD-10-CM

## 2023-10-04 DIAGNOSIS — Z79.01 LONG TERM CURRENT USE OF ANTICOAGULANT THERAPY: Primary | ICD-10-CM

## 2023-10-04 LAB — INR BLD: 2.1

## 2023-10-04 PROCEDURE — 85610 PROTHROMBIN TIME: CPT

## 2023-10-04 PROCEDURE — 99211 OFF/OP EST MAY X REQ PHY/QHP: CPT

## 2023-10-04 RX ORDER — DENOSUMAB 60 MG/ML
60 INJECTION SUBCUTANEOUS
COMMUNITY

## 2023-10-04 NOTE — PROGRESS NOTES
[facility]  [facility]ication Management  ANTICOAGULATION    Referring Provider: Cassy Damon CNP    GOAL INR: 2-3    TODAY'S INR: 2.1     WARFARIN Dosage: Continue warfarin 5 mg po every MWF; 2.5 mg po all other days    INR (no units)   Date Value   10/04/2023 2.1   2023 4.1   2023 1.7   2023 3.2   2023 2.4   2023 3.1   2023 1.8       Hemoglobin   Date Value Ref Range Status   2021 10.3 (L) 11.9 - 15.1 g/dL Final     Hematocrit   Date Value Ref Range Status   2021 33.8 (L) 36.3 - 47.1 % Final     ALT   Date Value Ref Range Status   2021 14 5 - 33 U/L Final     AST   Date Value Ref Range Status   2021 21 <32 U/L Final       Medication changes:  Prolia every 6 months    Notes:    Fingerstick INR drawn per clinic protocol. Patient states no visible blood in urine and no black tarry stool. Denies any missed doses of warfarin. No change in other maintenance medications or in diet. Will recheck INR in 4 weeks. Patient acknowledges working in consult agreement with pharmacist as referred by his/her physician. For Pharmacy Admin Tracking Only    Intervention Detail: Adherence Monitorin  Total # of Interventions Recommended: 1  Total # of Interventions Accepted: 1  Time Spent (min): [address] [place].  SELINA Quiñonez [facility]

## 2023-11-03 ENCOUNTER — HOSPITAL ENCOUNTER (OUTPATIENT)
Dept: PHARMACY | Age: 58
Setting detail: THERAPIES SERIES
Discharge: HOME | End: 2023-11-03
Payer: COMMERCIAL

## 2023-11-03 VITALS
DIASTOLIC BLOOD PRESSURE: 63 MMHG | BODY MASS INDEX: 30.18 KG/M2 | HEART RATE: 69 BPM | SYSTOLIC BLOOD PRESSURE: 104 MMHG | WEIGHT: 187 LBS

## 2023-11-03 DIAGNOSIS — Z79.01 LONG TERM CURRENT USE OF ANTICOAGULANT THERAPY: Primary | ICD-10-CM

## 2023-11-03 DIAGNOSIS — Z95.2 S/P AVR (AORTIC VALVE REPLACEMENT): ICD-10-CM

## 2023-11-03 LAB — INR BLD: 2.1

## 2023-11-03 PROCEDURE — 99211 OFF/OP EST MAY X REQ PHY/QHP: CPT

## 2023-11-03 PROCEDURE — 85610 PROTHROMBIN TIME: CPT

## 2023-11-03 RX ORDER — OMEPRAZOLE 40 MG/1
40 CAPSULE, DELAYED RELEASE ORAL DAILY
COMMUNITY
Start: 2023-09-18

## 2023-11-03 NOTE — PROGRESS NOTES
[address] [facility]  Medication Management  ANTICOAGULATION       Referring Provider: Duane Turner CNP     GOAL INR: 2 - 3     TODAY'S INR: 2.1     WARFARIN Dosage: Continue warfarin 2 tablets for 5 mg MWF and 2.5 mg tablet all other days    INR (no units)   Date Value   10/04/2023 2.1   2023 4.1   2023 1.7   2023 3.2   2023 2.4   2023 3.1   2023 1.8       Hemoglobin   Date Value Ref Range Status   2021 10.3 (L) 11.9 - 15.1 g/dL Final     Hematocrit   Date Value Ref Range Status   2021 33.8 (L) 36.3 - 47.1 % Final     ALT   Date Value Ref Range Status   2021 14 5 - 33 U/L Final     AST   Date Value Ref Range Status   2021 21 <32 U/L Final       Medication changes:  none    Notes:    Fingerstick INR drawn per clinic protocol. Patient states no visible blood in urine and no black tarry stool. Denies any missed doses of warfarin. No change in other maintenance medications or in diet. Will recheck INR in 6 weeks. Patient acknowledges working in consult agreement with pharmacist as referred by his/her physician.                   For Pharmacy Admin Tracking Only    Intervention Detail: Adherence Monitorin  Total # of Interventions Recommended: 0  Total # of Interventions Accepted: 0  Time Spent (min): [address] , [address] [facility], PharmROBERT

## 2023-12-15 ENCOUNTER — HOSPITAL ENCOUNTER (OUTPATIENT)
Dept: PHARMACY | Age: 58
Setting detail: THERAPIES SERIES
Discharge: HOME | End: 2023-12-15
Payer: COMMERCIAL

## 2023-12-15 VITALS
SYSTOLIC BLOOD PRESSURE: 124 MMHG | BODY MASS INDEX: 30.83 KG/M2 | HEART RATE: 69 BPM | WEIGHT: 191 LBS | DIASTOLIC BLOOD PRESSURE: 66 MMHG

## 2023-12-15 DIAGNOSIS — Z79.01 LONG TERM CURRENT USE OF ANTICOAGULANT THERAPY: Primary | ICD-10-CM

## 2023-12-15 DIAGNOSIS — Z95.2 S/P AVR (AORTIC VALVE REPLACEMENT): ICD-10-CM

## 2023-12-15 LAB — INR BLD: 2.5

## 2023-12-15 PROCEDURE — 99211 OFF/OP EST MAY X REQ PHY/QHP: CPT

## 2023-12-15 PROCEDURE — 85610 PROTHROMBIN TIME: CPT

## 2023-12-15 NOTE — PROGRESS NOTES
[address] [facility]  Medication Management  ANTICOAGULATION    Referring Provider: Delia Darling CNP    GOAL INR: 2-3    TODAY'S INR: 2.5    WARFARIN Dosage: Continue warfarin 5 mg po every MWF; 2.5 mg po all other days    INR (no units)   Date Value   2023 2.1   10/04/2023 2.1   2023 4.1   2023 1.7   2023 3.2   2023 2.4   2023 3.1       Hemoglobin   Date Value Ref Range Status   2021 10.3 (L) 11.9 - 15.1 g/dL Final     Hematocrit   Date Value Ref Range Status   2021 33.8 (L) 36.3 - 47.1 % Final     ALT   Date Value Ref Range Status   2021 14 5 - 33 U/L Final     AST   Date Value Ref Range Status   2021 21 <32 U/L Final       Medication changes:  No changes    Notes:    Fingerstick INR drawn per clinic protocol. Patient states no visible blood in urine and no black tarry stool. Denies any missed doses of warfarin. No change in other maintenance medications or in diet. Will recheck INR in 6 weeks. Patient acknowledges working in consult agreement with pharmacist as referred by his/her physician. For Pharmacy Admin Tracking Only    Intervention Detail: Adherence Monitorin  Total # of Interventions Recommended: 1  Total # of Interventions Accepted: 1  Time Spent (min): [address] [place].  [facility], [address] [place] [address]

## 2024-01-26 ENCOUNTER — HOSPITAL ENCOUNTER (OUTPATIENT)
Dept: PHARMACY | Age: 59
Setting detail: THERAPIES SERIES
Discharge: HOME | End: 2024-01-26
Payer: COMMERCIAL

## 2024-01-26 VITALS
WEIGHT: 185 LBS | BODY MASS INDEX: 29.86 KG/M2 | DIASTOLIC BLOOD PRESSURE: 64 MMHG | SYSTOLIC BLOOD PRESSURE: 106 MMHG | HEART RATE: 65 BPM

## 2024-01-26 DIAGNOSIS — Z95.2 S/P AVR (AORTIC VALVE REPLACEMENT): ICD-10-CM

## 2024-01-26 DIAGNOSIS — Z79.01 LONG TERM CURRENT USE OF ANTICOAGULANT THERAPY: Primary | ICD-10-CM

## 2024-01-26 LAB — INR BLD: 1.8

## 2024-01-26 PROCEDURE — 99212 OFFICE O/P EST SF 10 MIN: CPT

## 2024-01-26 PROCEDURE — 85610 PROTHROMBIN TIME: CPT

## 2024-01-26 NOTE — PROGRESS NOTES
[facility]  Medication Management  ANTICOAGULATION    Referring Provider: Michelle Carbone CNP     GOAL INR: 2 - 3     TODAY'S INR: 1.8     WARFARIN Dosage: Take 3 tablets for 7.5 mg today then continue warfarin 2 tablets for 5 mg MWF and 2.5 mg tablet all other days.    INR (no units)   Date Value   12/15/2023 2.5   2023 2.1   10/04/2023 2.1   2023 4.1   2023 1.7   2023 3.2   2023 2.4       Hemoglobin   Date Value Ref Range Status   2021 10.3 (L) 11.9 - 15.1 g/dL Final     Hematocrit   Date Value Ref Range Status   2021 33.8 (L) 36.3 - 47.1 % Final     ALT   Date Value Ref Range Status   2021 14 5 - 33 U/L Final     AST   Date Value Ref Range Status   2021 21 <32 U/L Final       Medication changes:  none    Notes:    Fingerstick INR drawn per clinic protocol. Patient states no visible blood in urine and no black tarry stool. Denies any missed doses of warfarin. No change in other maintenance medications or in diet. Will recheck INR in 4 weeks. Patient acknowledges working in consult agreement with pharmacist as referred by his/her physician.                  For Pharmacy Admin Tracking Only    Intervention Detail: Adherence Monitorin and Dose Adjustment: 1, reason: Therapy Optimization  Total # of Interventions Recommended: 1  Total # of Interventions Accepted: 1  Time Spent (min): 20      Vee Rivera [facility], PharmD

## 2024-02-23 ENCOUNTER — HOSPITAL ENCOUNTER (OUTPATIENT)
Dept: PHARMACY | Age: 59
Setting detail: THERAPIES SERIES
Discharge: HOME | End: 2024-02-23
Payer: COMMERCIAL

## 2024-02-23 VITALS
WEIGHT: 188 LBS | SYSTOLIC BLOOD PRESSURE: 142 MMHG | HEIGHT: 66 IN | BODY MASS INDEX: 30.22 KG/M2 | HEART RATE: 66 BPM | DIASTOLIC BLOOD PRESSURE: 65 MMHG

## 2024-02-23 DIAGNOSIS — Z95.2 S/P AVR (AORTIC VALVE REPLACEMENT): ICD-10-CM

## 2024-02-23 DIAGNOSIS — Z79.01 LONG TERM CURRENT USE OF ANTICOAGULANT THERAPY: Primary | ICD-10-CM

## 2024-02-23 LAB — INR BLD: 1.8

## 2024-02-23 PROCEDURE — 85610 PROTHROMBIN TIME: CPT | Performed by: FAMILY MEDICINE

## 2024-02-23 PROCEDURE — 99212 OFFICE O/P EST SF 10 MIN: CPT | Performed by: FAMILY MEDICINE

## 2024-02-23 NOTE — PROGRESS NOTES
[facility]  [facility]  ANTICOAGULATION    Referring Provider: KINGSLEY Carbone    GOAL INR: 2.0-3.0    TODAY'S INR: 1.8    WARFARIN Dosage: 7.5mg x1 then increase to 2.5mg TRSat, 5mg all other days    INR (no units)   Date Value   2024 1.8   2024 1.8   12/15/2023 2.5   2023 2.1   10/04/2023 2.1   2023 4.1   2023 1.7       Hemoglobin   Date Value Ref Range Status   2021 10.3 (L) 11.9 - 15.1 g/dL Final     Hematocrit   Date Value Ref Range Status   2021 33.8 (L) 36.3 - 47.1 % Final     ALT   Date Value Ref Range Status   2021 14 5 - 33 U/L Final     AST   Date Value Ref Range Status   2021 21 <32 U/L Final       Medication changes:  No changes    Notes:    Fingerstick INR drawn per clinic protocol. Patient states no visible blood in urine and no black tarry stool. Denies any missed doses of warfarin. No change in other maintenance medications or in diet. Will recheck INR in 10 days. Patient acknowledges working in consult agreement with pharmacist as referred by his/her physician.                  For Pharmacy Admin Tracking Only    Intervention Detail: Adherence Monitorin and Dose Adjustment: 2, reason: Therapy Optimization  Total # of Interventions Recommended: 4  Total # of Interventions Accepted: 4  Time Spent (min): 20      Tala Mena R.Ph., 2024,11:42 AM

## 2024-02-23 NOTE — PATIENT INSTRUCTIONS
Please take 3 tablets (7.5mg) warfarin today then increase your dosing to take warfarin 2.5mg (1 tablet) Tuesday Thursday and Saturday and 5mg (2 tablets) all other days.  Continue to monitor urine and stool for signs and symptoms of bleeding.   Please notify the clinic of any medication changes.   Please remember to bring all medications (both prescription and OTC) to your next visit.   Kindly notify the clinic if you are unable to make to your next appointment.   Follow warfarin dosing instructions on dosing calendar provided.

## 2024-03-05 ENCOUNTER — HOSPITAL ENCOUNTER (OUTPATIENT)
Dept: PHARMACY | Age: 59
Setting detail: THERAPIES SERIES
Discharge: HOME | End: 2024-03-05
Payer: COMMERCIAL

## 2024-03-05 VITALS — DIASTOLIC BLOOD PRESSURE: 65 MMHG | HEART RATE: 72 BPM | SYSTOLIC BLOOD PRESSURE: 118 MMHG

## 2024-03-05 DIAGNOSIS — Z95.2 S/P AVR (AORTIC VALVE REPLACEMENT): ICD-10-CM

## 2024-03-05 DIAGNOSIS — Z79.01 LONG TERM CURRENT USE OF ANTICOAGULANT THERAPY: Primary | ICD-10-CM

## 2024-03-05 LAB — INR BLD: 3.6

## 2024-03-05 PROCEDURE — 99212 OFFICE O/P EST SF 10 MIN: CPT

## 2024-03-05 PROCEDURE — 85610 PROTHROMBIN TIME: CPT

## 2024-03-05 NOTE — PROGRESS NOTES
[facility]  Medication Management  ANTICOAGULATION    Referring Provider: KINGSLEY Carbone    GOAL INR: 2.0-3.0    TODAY'S INR: 3.6    WARFARIN Dosage: Hold x 1, then decrease warfarin to 5 mg po every MWF; 2.5 mg po all other days    INR (no units)   Date Value   2024 3.6   2024 1.8   2024 1.8   12/15/2023 2.5   2023 2.1   10/04/2023 2.1   2023 4.1       Hemoglobin   Date Value Ref Range Status   2021 10.3 (L) 11.9 - 15.1 g/dL Final     Hematocrit   Date Value Ref Range Status   2021 33.8 (L) 36.3 - 47.1 % Final     ALT   Date Value Ref Range Status   2021 14 5 - 33 U/L Final     AST   Date Value Ref Range Status   2021 21 <32 U/L Final       Medication changes:  No changes    Notes:    Fingerstick INR drawn per clinic protocol. Patient states no visible blood in urine and no black tarry stool. Denies any missed doses of warfarin. No change in other maintenance medications or in diet. Will recheck INR in 2 weeks. Patient acknowledges working in consult agreement with pharmacist as referred by his/her physician.                  For Pharmacy Admin Tracking Only    Intervention Detail: Adherence Monitorin and Dose Adjustment: 2, reason: Improve Adherence, Therapy De-escalation, Therapy Optimization  Total # of Interventions Recommended: 3  Total # of Interventions Accepted: 3  Time Spent (min): 20     Sofía Raymond RPh

## 2024-03-21 ENCOUNTER — APPOINTMENT (OUTPATIENT)
Dept: PHARMACY | Age: 59
End: 2024-03-21
Payer: COMMERCIAL

## 2024-03-21 VITALS
DIASTOLIC BLOOD PRESSURE: 69 MMHG | HEART RATE: 66 BPM | BODY MASS INDEX: 29.86 KG/M2 | SYSTOLIC BLOOD PRESSURE: 117 MMHG | WEIGHT: 185 LBS

## 2024-03-21 DIAGNOSIS — Z95.2 S/P AVR (AORTIC VALVE REPLACEMENT): ICD-10-CM

## 2024-03-21 DIAGNOSIS — Z79.01 LONG TERM CURRENT USE OF ANTICOAGULANT THERAPY: Primary | ICD-10-CM

## 2024-03-21 LAB — INR BLD: 3.5

## 2024-03-21 PROCEDURE — 99212 OFFICE O/P EST SF 10 MIN: CPT

## 2024-03-21 PROCEDURE — 85610 PROTHROMBIN TIME: CPT

## 2024-03-21 NOTE — PROGRESS NOTES
[facility]  Medication Management  ANTICOAGULATION    Referring Provider: Tiffany Bragg NP     GOAL INR: 2 - 3     TODAY'S INR: 3.5     WARFARIN Dosage: Hold warfarin today then decrease warfarin to 2 tablets for 5 mg MF and 2.5 mg tablet all other days.       INR (no units)   Date Value   2024 3.6   2024 1.8   2024 1.8   12/15/2023 2.5   2023 2.1   10/04/2023 2.1   2023 4.1       Hemoglobin   Date Value Ref Range Status   2021 10.3 (L) 11.9 - 15.1 g/dL Final     Hematocrit   Date Value Ref Range Status   2021 33.8 (L) 36.3 - 47.1 % Final     ALT   Date Value Ref Range Status   2021 14 5 - 33 U/L Final     AST   Date Value Ref Range Status   2021 21 <32 U/L Final       Medication changes:  none    Notes:    Fingerstick INR drawn per clinic protocol. Patient states no visible blood in urine and no black tarry stool. Denies any missed doses of warfarin. No change in other maintenance medications or in diet. Will recheck INR in 2.5 weeks. Patient acknowledges working in consult agreement with pharmacist as referred by his/her physician.  Patient is anemic and is talking to [facility] today regarding anemia via a telephone visit.                For Pharmacy Admin Tracking Only    Intervention Detail: Adherence Monitorin and Dose Adjustment: 1, reason: Therapy Optimization  Total # of Interventions Recommended: 2  Total # of Interventions Accepted: 2  Time Spent (min): 20      Vee Rivera [facility], PharmD

## 2024-04-08 ENCOUNTER — HOSPITAL ENCOUNTER (OUTPATIENT)
Dept: PHARMACY | Age: 59
Setting detail: THERAPIES SERIES
Discharge: HOME | End: 2024-04-08
Payer: COMMERCIAL

## 2024-04-08 VITALS
DIASTOLIC BLOOD PRESSURE: 70 MMHG | SYSTOLIC BLOOD PRESSURE: 122 MMHG | HEART RATE: 63 BPM | BODY MASS INDEX: 30.51 KG/M2 | WEIGHT: 189 LBS

## 2024-04-08 DIAGNOSIS — Z95.2 S/P AVR (AORTIC VALVE REPLACEMENT): ICD-10-CM

## 2024-04-08 DIAGNOSIS — Z79.01 LONG TERM CURRENT USE OF ANTICOAGULANT THERAPY: Primary | ICD-10-CM

## 2024-04-08 LAB — INR BLD: 2.1

## 2024-04-08 PROCEDURE — 85610 PROTHROMBIN TIME: CPT

## 2024-04-08 PROCEDURE — 99211 OFF/OP EST MAY X REQ PHY/QHP: CPT

## 2024-04-08 NOTE — PROGRESS NOTES
[facility]/[place]  Medication Management  ANTICOAGULATION    Referring Provider: Tiffany Bragg NP     GOAL INR: 2 - 3     TODAY'S INR: 2.1     WARFARIN Dosage: Continue warfarin 2 tablets for 5 mg MF and 2.5 mg tablet all other days.       INR (no units)   Date Value   2024 2.1   2024 3.5   2024 3.6   2024 1.8   2024 1.8   12/15/2023 2.5   2023 2.1       Hemoglobin   Date Value Ref Range Status   2021 10.3 (L) 11.9 - 15.1 g/dL Final     Hematocrit   Date Value Ref Range Status   2021 33.8 (L) 36.3 - 47.1 % Final     ALT   Date Value Ref Range Status   2021 14 5 - 33 U/L Final     AST   Date Value Ref Range Status   2021 21 <32 U/L Final       Medication changes:  none    Notes:    Fingerstick INR drawn per clinic protocol. Patient states no visible blood in urine and no black tarry stool. Denies any missed doses of warfarin. No change in other maintenance medications or in diet. Will recheck INR in 4 weeks per patient request. Patient acknowledges working in consult agreement with pharmacist as referred by his/her physician.              For Pharmacy Admin Tracking Only    Intervention Detail: Adherence Monitorin  Total # of Interventions Recommended: 1  Total # of Interventions Accepted: 1  Time Spent (min): 20       Sofía Raymond [facility]

## 2024-05-06 ENCOUNTER — HOSPITAL ENCOUNTER (OUTPATIENT)
Dept: PHARMACY | Age: 59
Setting detail: THERAPIES SERIES
Discharge: HOME | End: 2024-05-06
Payer: COMMERCIAL

## 2024-05-06 VITALS
SYSTOLIC BLOOD PRESSURE: 113 MMHG | DIASTOLIC BLOOD PRESSURE: 60 MMHG | WEIGHT: 189 LBS | HEART RATE: 65 BPM | BODY MASS INDEX: 30.51 KG/M2

## 2024-05-06 DIAGNOSIS — Z95.2 S/P AVR (AORTIC VALVE REPLACEMENT): ICD-10-CM

## 2024-05-06 DIAGNOSIS — Z79.01 LONG TERM CURRENT USE OF ANTICOAGULANT THERAPY: Primary | ICD-10-CM

## 2024-05-06 LAB — INR BLD: 1.7

## 2024-05-06 PROCEDURE — 85610 PROTHROMBIN TIME: CPT

## 2024-05-06 PROCEDURE — 99212 OFFICE O/P EST SF 10 MIN: CPT

## 2024-05-06 NOTE — PROGRESS NOTES
[facility]  Medication Management  ANTICOAGULATION    Referring Provider: Tiffany Bragg NP     GOAL INR: 2 - 3     TODAY'S INR: 1.7     WARFARIN Dosage: 7.5 mg x 1, then increase warfarin to 2 tablets for 5 mg MWF and 2.5 mg tablet all other days       INR (no units)   Date Value   2024 1.7   2024 2.1   2024 3.5   2024 3.6   2024 1.8   2024 1.8   12/15/2023 2.5       Hemoglobin   Date Value Ref Range Status   2021 10.3 (L) 11.9 - 15.1 g/dL Final     Hematocrit   Date Value Ref Range Status   2021 33.8 (L) 36.3 - 47.1 % Final     ALT   Date Value Ref Range Status   2021 14 5 - 33 U/L Final     AST   Date Value Ref Range Status   2021 21 <32 U/L Final       Medication changes:  none    Notes:    Fingerstick INR drawn per clinic protocol. Patient states no visible blood in urine and no black tarry stool. Denies any missed doses of warfarin. No change in other maintenance medications or in diet. Will recheck INR in 3 weeks.  Patient acknowledges working in consult agreement with pharmacist as referred by his/her physician.              For Pharmacy Admin Tracking Only    Intervention Detail: Adherence Monitorin and Dose Adjustment: 2, reason: Improve Adherence, Therapy Optimization  Total # of Interventions Recommended: 3  Total # of Interventions Accepted: 3  Time Spent (min): 20     Sofía Raymond RP

## 2024-05-06 NOTE — PATIENT INSTRUCTIONS
Please take warfarin 7.5 mg today.   Increase current dose of warfarin as instructed on dosing calendar provided.   Continue to monitor urine and stool for signs and symptoms of bleeding.   Please notify the clinic of any medication changes.   Please remember to bring all medications (both prescription and OTC) to your next visit. Kindly notify the clinic if you are unable to make to your next appointment.

## 2024-05-24 ENCOUNTER — APPOINTMENT (OUTPATIENT)
Dept: PHARMACY | Age: 59
End: 2024-05-24
Payer: COMMERCIAL

## 2024-05-24 VITALS
WEIGHT: 188 LBS | SYSTOLIC BLOOD PRESSURE: 119 MMHG | BODY MASS INDEX: 30.34 KG/M2 | DIASTOLIC BLOOD PRESSURE: 57 MMHG | HEART RATE: 62 BPM

## 2024-05-24 DIAGNOSIS — Z79.01 LONG TERM CURRENT USE OF ANTICOAGULANT THERAPY: Primary | ICD-10-CM

## 2024-05-24 DIAGNOSIS — Z95.2 S/P AVR (AORTIC VALVE REPLACEMENT): ICD-10-CM

## 2024-05-24 LAB — INR BLD: 2.4

## 2024-05-24 PROCEDURE — 99211 OFF/OP EST MAY X REQ PHY/QHP: CPT

## 2024-05-24 PROCEDURE — 85610 PROTHROMBIN TIME: CPT

## 2024-05-24 NOTE — PROGRESS NOTES
[facility]  Medication Management  ANTICOAGULATION    Referring Provider: Tiffany Bragg NP     GOAL INR: 2 - 3     TODAY'S INR: 2.4     WARFARIN Dosage: Continue Warfarin 2 tablets for 5 mg MWF and 2.5 mg tablet all other days    INR (no units)   Date Value   2024 1.7   2024 2.1   2024 3.5   2024 3.6   2024 1.8   2024 1.8   12/15/2023 2.5       Hemoglobin   Date Value Ref Range Status   2021 10.3 (L) 11.9 - 15.1 g/dL Final     Hematocrit   Date Value Ref Range Status   2021 33.8 (L) 36.3 - 47.1 % Final     ALT   Date Value Ref Range Status   2021 14 5 - 33 U/L Final     AST   Date Value Ref Range Status   2021 21 <32 U/L Final       Medication changes:  none    Notes:    Fingerstick INR drawn per clinic protocol. Patient states no visible blood in urine and no black tarry stool. Denies any missed doses of warfarin. No change in other maintenance medications or in diet. Will recheck INR in 4 weeks. Patient acknowledges working in consult agreement with pharmacist as referred by his/her physician.                  For Pharmacy Admin Tracking Only    Intervention Detail: Adherence Monitorin  Total # of Interventions Recommended: 1  Total # of Interventions Accepted: 1  Time Spent (min): 20      Arianna SifuentesD, 2024 11:02 AM

## 2024-06-24 ENCOUNTER — APPOINTMENT (OUTPATIENT)
Dept: PHARMACY | Age: 59
End: 2024-06-24
Payer: COMMERCIAL

## 2024-07-01 ENCOUNTER — HOSPITAL ENCOUNTER (OUTPATIENT)
Dept: PHARMACY | Age: 59
Setting detail: THERAPIES SERIES
Discharge: HOME | End: 2024-07-01
Payer: COMMERCIAL

## 2024-07-01 VITALS
SYSTOLIC BLOOD PRESSURE: 110 MMHG | HEART RATE: 65 BPM | DIASTOLIC BLOOD PRESSURE: 62 MMHG | BODY MASS INDEX: 30.18 KG/M2 | WEIGHT: 187 LBS

## 2024-07-01 DIAGNOSIS — Z79.01 LONG TERM CURRENT USE OF ANTICOAGULANT THERAPY: Primary | ICD-10-CM

## 2024-07-01 DIAGNOSIS — Z95.2 S/P AVR (AORTIC VALVE REPLACEMENT): ICD-10-CM

## 2024-07-01 LAB — INR BLD: 2.4

## 2024-07-01 PROCEDURE — 85610 PROTHROMBIN TIME: CPT

## 2024-07-01 PROCEDURE — 99211 OFF/OP EST MAY X REQ PHY/QHP: CPT

## 2024-07-01 NOTE — PROGRESS NOTES
[facility]  Medication Management  ANTICOAGULATION    Referring Provider: Tiffany Bragg NP     GOAL INR: 2 - 3     TODAY'S INR: 2.1     WARFARIN Dosage: Continue Warfarin 2 tablets for 5 mg MWF and 2.5 mg tablet all other days    INR (no units)   Date Value   2024 2.1   2024 2.4   2024 1.7   2024 2.1   2024 3.5   2024 3.6   2024 1.8       Hemoglobin   Date Value Ref Range Status   2021 10.3 (L) 11.9 - 15.1 g/dL Final     Hematocrit   Date Value Ref Range Status   2021 33.8 (L) 36.3 - 47.1 % Final     ALT   Date Value Ref Range Status   2021 14 5 - 33 U/L Final     AST   Date Value Ref Range Status   2021 21 <32 U/L Final       Medication changes:  none    Notes:    Fingerstick INR drawn per clinic protocol. Patient states no visible blood in urine and no black tarry stool. Denies any missed doses of warfarin. No change in other maintenance medications or in diet. Will recheck INR in 6 weeks. Patient acknowledges working in consult agreement with pharmacist as referred by his/her physician.                  For Pharmacy Admin Tracking Only    Intervention Detail: Adherence Monitorin  Total # of Interventions Recommended: 1  Total # of Interventions Accepted: 1  Time Spent (min): 20       Sofía Raymond [facility]

## 2024-07-01 NOTE — PROGRESS NOTES
[facility]  Medication Management  ANTICOAGULATION    Referring Provider: Tiffany Bragg NP    GOAL INR: 2-3    TODAY'S INR: 2.4    WARFARIN Dosage: Continue 5 mg po every MWF; 2.5 mg po all other days    INR (no units)   Date Value   2024 2.4   2024 2.4   2024 1.7   2024 2.1   2024 3.5   2024 3.6   2024 1.8       Hemoglobin   Date Value Ref Range Status   2021 10.3 (L) 11.9 - 15.1 g/dL Final     Hematocrit   Date Value Ref Range Status   2021 33.8 (L) 36.3 - 47.1 % Final     ALT   Date Value Ref Range Status   2021 14 5 - 33 U/L Final     AST   Date Value Ref Range Status   2021 21 <32 U/L Final       Medication changes:  No changes    Notes:    Fingerstick INR drawn per clinic protocol. Patient states no visible blood in urine and no black tarry stool. Denies any missed doses of warfarin. No change in other maintenance medications or in diet. Will recheck INR in 6 weeks. Patient acknowledges working in consult agreement with pharmacist as referred by his/her physician.   Patient reports nosebleed last week when outside fishing - believes likely due to nasal dryness with oxygen.                  For Pharmacy Admin Tracking Only    Intervention Detail: Adherence Monitorin  Total # of Interventions Recommended: 1  Total # of Interventions Accepted: 1  Time Spent (min): 20      Sofía Raymond RPH

## 2024-08-13 ENCOUNTER — ANTI-COAG VISIT (OUTPATIENT)
Age: 59
End: 2024-08-13
Payer: COMMERCIAL

## 2024-08-13 VITALS
BODY MASS INDEX: 30.02 KG/M2 | HEART RATE: 71 BPM | DIASTOLIC BLOOD PRESSURE: 68 MMHG | WEIGHT: 186 LBS | SYSTOLIC BLOOD PRESSURE: 123 MMHG

## 2024-08-13 DIAGNOSIS — Z79.01 LONG TERM CURRENT USE OF ANTICOAGULANT THERAPY: Primary | ICD-10-CM

## 2024-08-13 DIAGNOSIS — Z95.2 S/P AVR (AORTIC VALVE REPLACEMENT): ICD-10-CM

## 2024-08-13 LAB
INTERNATIONAL NORMALIZATION RATIO, POC: 2.1
PROTHROMBIN TIME, POC: 0

## 2024-08-13 PROCEDURE — 99211 OFF/OP EST MAY X REQ PHY/QHP: CPT

## 2024-08-13 PROCEDURE — 85610 PROTHROMBIN TIME: CPT

## 2024-08-13 NOTE — PROGRESS NOTES
[facility]  Medication Management  ANTICOAGULATION    Referring Provider: Tiffany Bragg NP     GOAL INR: 2-3     TODAY'S INR: 2.1     WARFARIN Dosage: Continue 5 mg po every MWF; 2.5 mg po all other days    INR (no units)   Date Value   2024 2.4   2024 2.4   2024 1.7   2024 2.1   2024 3.5   2024 3.6   2024 1.8       Hemoglobin   Date Value Ref Range Status   2021 10.3 (L) 11.9 - 15.1 g/dL Final     Hematocrit   Date Value Ref Range Status   2021 33.8 (L) 36.3 - 47.1 % Final     ALT   Date Value Ref Range Status   2021 14 5 - 33 U/L Final     AST   Date Value Ref Range Status   2021 21 <32 U/L Final       Medication changes:  none    Notes:    Fingerstick INR drawn per clinic protocol. Patient states no visible blood in urine and no black tarry stool. Denies any missed doses of warfarin. No change in other maintenance medications or in diet. Will recheck INR in 6 weeks. Patient acknowledges working in consult agreement with pharmacist as referred by his/her physician.                  For Pharmacy Admin Tracking Only    Intervention Detail: Adherence Monitorin  Total # of Interventions Recommended: 1  Total # of Interventions Accepted: 1  Time Spent (min): 20      Mara Louise RPH, PharmD

## 2024-09-25 ENCOUNTER — ANTI-COAG VISIT (OUTPATIENT)
Age: 59
End: 2024-09-25
Payer: COMMERCIAL

## 2024-09-25 DIAGNOSIS — Z95.2 S/P AVR (AORTIC VALVE REPLACEMENT): ICD-10-CM

## 2024-09-25 DIAGNOSIS — Z79.01 LONG TERM CURRENT USE OF ANTICOAGULANT THERAPY: Primary | ICD-10-CM

## 2024-09-25 LAB
INTERNATIONAL NORMALIZATION RATIO, POC: 3.3
PROTHROMBIN TIME, POC: 0

## 2024-09-25 PROCEDURE — 99212 OFFICE O/P EST SF 10 MIN: CPT

## 2024-09-25 PROCEDURE — 85610 PROTHROMBIN TIME: CPT

## 2024-10-18 ENCOUNTER — APPOINTMENT (OUTPATIENT)
Age: 59
End: 2024-10-18
Payer: COMMERCIAL

## 2024-10-23 ENCOUNTER — ANTI-COAG VISIT (OUTPATIENT)
Age: 59
End: 2024-10-23
Payer: COMMERCIAL

## 2024-10-23 VITALS
BODY MASS INDEX: 29.05 KG/M2 | SYSTOLIC BLOOD PRESSURE: 114 MMHG | DIASTOLIC BLOOD PRESSURE: 63 MMHG | WEIGHT: 180 LBS | HEART RATE: 71 BPM

## 2024-10-23 DIAGNOSIS — Z95.2 S/P AVR (AORTIC VALVE REPLACEMENT): ICD-10-CM

## 2024-10-23 DIAGNOSIS — Z79.01 LONG TERM CURRENT USE OF ANTICOAGULANT THERAPY: Primary | ICD-10-CM

## 2024-10-23 LAB
INTERNATIONAL NORMALIZATION RATIO, POC: 2.8
PROTHROMBIN TIME, POC: 0

## 2024-10-23 PROCEDURE — 85610 PROTHROMBIN TIME: CPT

## 2024-10-23 PROCEDURE — 99212 OFFICE O/P EST SF 10 MIN: CPT

## 2024-10-23 PROCEDURE — 99211 OFF/OP EST MAY X REQ PHY/QHP: CPT

## 2024-10-23 RX ORDER — ALBUTEROL SULFATE 0.83 MG/ML
2.5 SOLUTION RESPIRATORY (INHALATION) EVERY 6 HOURS PRN
COMMUNITY
Start: 2024-08-30

## 2024-10-23 RX ORDER — WARFARIN SODIUM 2.5 MG/1
2.5 TABLET ORAL SEE ADMIN INSTRUCTIONS
Qty: 126 TABLET | Refills: 3 | Status: SHIPPED | OUTPATIENT
Start: 2024-10-23

## 2024-10-23 NOTE — PROGRESS NOTES
[facility]  Medication Management  ANTICOAGULATION    Referring Provider: Tiffany Bragg NP     GOAL INR: 2 - 3     TODAY'S INR: 2.8     WARFARIN Dosage: Continue warfarin 2 tablets for 5 mg MWF and 2.5 mg tablet all other days.    INR (no units)   Date Value   2024 2.4   2024 2.4   2024 1.7   2024 2.1   2024 3.5   2024 3.6   2024 1.8     INR,(POC) (no units)   Date Value   2024 3.3   2024 2.1       Hemoglobin   Date Value Ref Range Status   2021 10.3 (L) 11.9 - 15.1 g/dL Final     Hematocrit   Date Value Ref Range Status   2021 33.8 (L) 36.3 - 47.1 % Final     ALT   Date Value Ref Range Status   2021 14 5 - 33 U/L Final     AST   Date Value Ref Range Status   2021 21 <32 U/L Final       Medication changes:  none    Notes:    Fingerstick INR drawn per clinic protocol. Patient states no visible blood in urine and no black tarry stool. Denies any missed doses of warfarin. No change in other maintenance medications or in diet. Will recheck INR in 6 weeks. Patient acknowledges working in consult agreement with pharmacist as referred by his/her physician.        Called new RX into [facility] for warfarin 2.5 mg tablets take 2 tablets for 5 mg MWF and 2.5 mg tablet all other days #126 with 3 refills per Tiffany Bragg NP.              For Pharmacy Admin Tracking Only    Intervention Detail: Adherence Monitorin and New Rx: 1, reason: Needs Additional Therapy  Total # of Interventions Recommended: 2  Total # of Interventions Accepted: 2  Time Spent (min): 20      Vee Rivera [facility], PharmD

## 2024-12-02 ENCOUNTER — APPOINTMENT (OUTPATIENT)
Age: 59
End: 2024-12-02
Payer: COMMERCIAL

## 2024-12-17 ENCOUNTER — ANTI-COAG VISIT (OUTPATIENT)
Age: 59
End: 2024-12-17
Payer: COMMERCIAL

## 2024-12-17 VITALS — WEIGHT: 181 LBS | BODY MASS INDEX: 29.21 KG/M2

## 2024-12-17 DIAGNOSIS — Z79.01 LONG TERM CURRENT USE OF ANTICOAGULANT THERAPY: Primary | ICD-10-CM

## 2024-12-17 DIAGNOSIS — Z95.2 S/P AVR (AORTIC VALVE REPLACEMENT): ICD-10-CM

## 2024-12-17 LAB
INTERNATIONAL NORMALIZATION RATIO, POC: 2.6
PROTHROMBIN TIME, POC: 0

## 2024-12-17 PROCEDURE — 85610 PROTHROMBIN TIME: CPT | Performed by: FAMILY MEDICINE

## 2024-12-17 PROCEDURE — 99211 OFF/OP EST MAY X REQ PHY/QHP: CPT | Performed by: FAMILY MEDICINE

## 2024-12-17 NOTE — PROGRESS NOTES
[facility]  [facility]ication [facility]  ANTICOAGULATION    Referring Provider: Tiffany Bragg CNP    GOAL INR: 2.0-3.0    TODAY'S INR: 2.6    WARFARIN Dosage: continue 5mg (2 tablets) MWF, 2.5mg (1 tablet) all other days    INR (no units)   Date Value   2024 2.4   2024 2.4   2024 1.7   2024 2.1   2024 3.5   2024 3.6   2024 1.8     INR,(POC) (no units)   Date Value   2024 2.6   10/23/2024 2.8   2024 3.3   2024 2.1       Hemoglobin   Date Value Ref Range Status   2021 10.3 (L) 11.9 - 15.1 g/dL Final     Hematocrit   Date Value Ref Range Status   2021 33.8 (L) 36.3 - 47.1 % Final     ALT   Date Value Ref Range Status   2021 14 5 - 33 U/L Final     AST   Date Value Ref Range Status   2021 21 <32 U/L Final       Medication changes:  No change    Notes:    Fingerstick INR drawn per clinic protocol. Patient states no visible blood in urine and no black tarry stool. Denies any missed doses of warfarin. No change in other maintenance medications or in diet. Will recheck INR in 6 weeks. BP machine unavailable at this time.  Patient acknowledges working in consult agreement with pharmacist as referred by his/her physician.                  For Pharmacy Admin Tracking Only    Intervention Detail: Adherence Monitorin  Total # of Interventions Recommended: 2  Total # of Interventions Accepted: 2  Time Spent (min): 20    Tala Mena R.Ph., 2024,10:17 AM

## 2024-12-17 NOTE — PATIENT INSTRUCTIONS
Continue taking warfarin 5mg (2 tablets) Monday Wednesday and Friday and 2.5mg (1 tablet) all other days.  Continue to monitor urine and stool for signs and symptoms of bleeding.   Please notify the clinic of any medication changes.   Please remember to bring all medications (both prescription and OTC) to your next visit.   Kindly notify the clinic if you are unable to make to your next appointment.   Follow warfarin dosing instructions on dosing calendar provided.

## 2025-01-03 NOTE — PROGRESS NOTES
[facility]/[place]  Medication Management  ANTICOAGULATION    Referring Provider: Maria Luz     GOAL INR: 2-3     TODAY'S INR: 1.8     WARFARIN Dosage: 5 mg warfarin x 1 booster dose, then resume 5 mg every MWF and whole 2.5 mg tablet all other days    INR (no units)   Date Value   07/15/2021 1.8   06/10/2021 2.3   2021 2.9   2021 2   2021 1.7   2021 1.4   2021 1.7       Medication changes:  Stopped Trelegy and started Anuro Ellipta 62.5/25 mcg inhaler 1 puff once daily on 2021    Notes:    Fingerstick INR drawn per clinic protocol. Patient states no visible blood in urine and no black tarry stool. Denies any missed doses of warfarin. Silvana Metz had an appointment with Dr. Theresa Gonsalez at the [facility] on 2021 and he discontinued her Trelegy and started Anoro Ellipta 62.5/25 mcg inhaler 1 puff once daily. She completed 3 treatments of radiation for stage 1 lung cancer, but still has some \"hoarseness\" post treatment. No change in other maintenance medications or in diet. Since her INR has dropped sub-therapeutic today, we will have her take 5 mg warfarin tonight x 1 booster dose, but then resume current weekly warfarin regimen thereafter as noted on her dosing calendar. We will recheck INR in 2 weeks and reassess further warfarin dosing at that time. Patient acknowledges working in consult agreement with pharmacist as referred by his/her physician.                   For Pharmacy Admin Tracking Only     Intervention Detail: Adherence Monitorin and Dose Adjustment: 1, reason: Therapy Optimization   Total # of Interventions Recommended: 2   Total # of Interventions Accepted: 2   Time Spent (min): [address] [facility], [address] [place] [address], PharmD obesity with BMI of 45.0-49.9, adult        3. Obstructive sleep apnea hypopnea, mild        4. Intolerance of continuous positive airway pressure (CPAP) ventilation              Plan   Juliet was seen today for follow-up.    Diagnoses and all orders for this visit:    Narcolepsy and cataplexy- controlled  Continue current therapies, no changes   Labs reviewed, look good.    Decrease iron supplement to once daily   Monitor salt intake    -     Ferritin; Future  -     Basic Metabolic Panel; Future    Morbid obesity with BMI of 45.0-49.9, adult- worsening   Pt counseled on obesity, health risks associated with obesity and counseled on need for weight reduction.       Obstructive sleep apnea hypopnea, mild  Does have persistent daytime sleepiness - stable due to narcolepsy vs. FREDY   Intolerance to CPAP and OAT.   Continue positional therapy   Work on weight loss     Intolerance of continuous positive airway pressure (CPAP) ventilation      Billing based on time, total time on encounter today : 20 min   Educated on good sleepy hygiene practices   Recommend 7-9 hours of sleep nightly   Will see Juliet Duran in: 1 year    [facility]   [facility]  1/3/2025

## 2025-01-28 ENCOUNTER — ANTI-COAG VISIT (OUTPATIENT)
Age: 60
End: 2025-01-28
Payer: MEDICARE

## 2025-01-28 VITALS
SYSTOLIC BLOOD PRESSURE: 118 MMHG | HEART RATE: 61 BPM | DIASTOLIC BLOOD PRESSURE: 64 MMHG | WEIGHT: 182 LBS | BODY MASS INDEX: 29.38 KG/M2

## 2025-01-28 DIAGNOSIS — Z95.2 S/P AVR (AORTIC VALVE REPLACEMENT): ICD-10-CM

## 2025-01-28 DIAGNOSIS — Z79.01 LONG TERM CURRENT USE OF ANTICOAGULANT THERAPY: Primary | ICD-10-CM

## 2025-01-28 LAB
INTERNATIONAL NORMALIZATION RATIO, POC: 1.7
PROTHROMBIN TIME, POC: 0

## 2025-01-28 PROCEDURE — 99212 OFFICE O/P EST SF 10 MIN: CPT | Performed by: COUNSELOR

## 2025-01-28 PROCEDURE — 85610 PROTHROMBIN TIME: CPT | Performed by: COUNSELOR

## 2025-01-28 NOTE — PATIENT INSTRUCTIONS
Please take warfarin 5 mg today.   Continue current dose of warfarin as instructed on dosing calendar provided.   Continue to monitor urine and stool for signs and symptoms of bleeding.   Please notify the clinic of any medication changes.   Please remember to bring all medications (both prescription and OTC) to your next visit. Kindly notify the clinic if you are unable to make to your next appointment.

## 2025-01-28 NOTE — PROGRESS NOTES
[facility]  Medication [facility]  ANTICOAGULATION    Referring Provider: Tiffany Bragg CNP    GOAL INR: 2-3    TODAY'S INR: 1.7    WARFARIN Dosage: 5 mg x 1, then continue 5 mg po every MWF; 2.5 mg po all other days    INR (no units)   Date Value   2024 2.4   2024 2.4   2024 1.7   2024 2.1   2024 3.5   2024 3.6   2024 1.8     INR,(POC) (no units)   Date Value   2025 1.7   2024 2.6   10/23/2024 2.8   2024 3.3   2024 2.1       Hemoglobin   Date Value Ref Range Status   2021 10.3 (L) 11.9 - 15.1 g/dL Final     Hematocrit   Date Value Ref Range Status   2021 33.8 (L) 36.3 - 47.1 % Final     ALT   Date Value Ref Range Status   2021 14 5 - 33 U/L Final     AST   Date Value Ref Range Status   2021 21 <32 U/L Final       Medication changes:  No changes    Notes:    Fingerstick INR drawn per clinic protocol. Patient states no visible blood in urine and no black tarry stool. Denies any missed doses of warfarin. No change in other maintenance medications or in diet. Will recheck INR in 3 weeks. Patient acknowledges working in consult agreement with pharmacist as referred by his/her physician.         Patient reports she was diagnosed with bronchitis at the end of December.  She completed 10 days of Augmentin and 2 courses of steroids.       For Pharmacy Admin Tracking Only    Intervention Detail: Adherence Monitorin and Dose Adjustment: 1, reason: Improve Adherence, Therapy Optimization  Total # of Interventions Recommended: 2  Total # of Interventions Accepted: 2  Time Spent (min): 20      Sofía Raymond RPH

## 2025-02-18 ENCOUNTER — APPOINTMENT (OUTPATIENT)
Age: 60
End: 2025-02-18
Payer: MEDICARE

## 2025-03-06 ENCOUNTER — ANTI-COAG VISIT (OUTPATIENT)
Age: 60
End: 2025-03-06
Payer: MEDICARE

## 2025-03-06 VITALS
DIASTOLIC BLOOD PRESSURE: 65 MMHG | SYSTOLIC BLOOD PRESSURE: 106 MMHG | HEART RATE: 78 BPM | WEIGHT: 171 LBS | BODY MASS INDEX: 27.6 KG/M2

## 2025-03-06 DIAGNOSIS — Z79.01 LONG TERM CURRENT USE OF ANTICOAGULANT THERAPY: Primary | ICD-10-CM

## 2025-03-06 DIAGNOSIS — Z95.2 S/P AVR (AORTIC VALVE REPLACEMENT): ICD-10-CM

## 2025-03-06 LAB
INTERNATIONAL NORMALIZATION RATIO, POC: 7.9
PROTHROMBIN TIME, POC: 0

## 2025-03-06 PROCEDURE — 85610 PROTHROMBIN TIME: CPT

## 2025-03-06 PROCEDURE — 99213 OFFICE O/P EST LOW 20 MIN: CPT

## 2025-03-06 RX ORDER — MONTELUKAST SODIUM 10 MG/1
10 TABLET ORAL NIGHTLY
COMMUNITY
Start: 2025-02-25 | End: 2026-02-25

## 2025-03-06 RX ORDER — FERROUS SULFATE 325(65) MG
325 TABLET ORAL
COMMUNITY
Start: 2025-02-20 | End: 2025-03-22

## 2025-03-06 RX ORDER — FUROSEMIDE 20 MG/1
20 TABLET ORAL DAILY
COMMUNITY
Start: 2025-02-19 | End: 2025-03-21

## 2025-03-06 RX ORDER — ESTRADIOL 0.1 MG/G
CREAM VAGINAL DAILY
COMMUNITY

## 2025-03-06 RX ORDER — POTASSIUM CHLORIDE 1500 MG/1
20 TABLET, EXTENDED RELEASE ORAL DAILY
COMMUNITY
Start: 2025-02-19

## 2025-03-06 NOTE — PROGRESS NOTES
[facility]  [facility]  ANTICOAGULATION    Referring Provider: Tiffany Bragg CNP     GOAL INR: 2 - 3     TODAY'S INR: 7.9     WARFARIN Dosage: Hold warfarin for 3 days then take 2.5 mg on  and return to clinic Monday    INR (no units)   Date Value   2024 2.4   2024 2.4   2024 1.7   2024 2.1   2024 3.5   2024 3.6   2024 1.8     INR,(POC) (no units)   Date Value   2025 1.7   2024 2.6   10/23/2024 2.8   2024 3.3   2024 2.1       Hemoglobin   Date Value Ref Range Status   2021 10.3 (L) 11.9 - 15.1 g/dL Final     Hematocrit   Date Value Ref Range Status   2021 33.8 (L) 36.3 - 47.1 % Final     ALT   Date Value Ref Range Status   2021 14 5 - 33 U/L Final     AST   Date Value Ref Range Status   2021 21 <32 U/L Final       Medication changes:  Doxycycline completed  Levaquin completed  Tessalon completed  Mucinex completed  Prednisone completed  Started Singulair  Started Lasix  Started potassium  Started Iron    Notes:    Fingerstick INR drawn per clinic protocol. Patient states no visible blood in urine and no black tarry stool. Denies any missed doses of warfarin. No change in other maintenance medications or in diet. Will recheck INR on Monday. Patient acknowledges working in consult agreement with pharmacist as referred by his/her physician.     Patient was hospitalized from 25 - 25 at [facility] for COPD exacerbation.  She was treated with Rocephin and Doxycycline.     She was discharged home on Doxycycline, Tessalon, Lasix, Singulair, Iron, Mucinex, and potassium.  Patient does not have an appetite yet.    For Pharmacy Admin Tracking Only    Intervention Detail: Adherence Monitorin, Dose Adjustment: 1, reason: Therapy Optimization, and New Rx: 9, reason: Needs Additional Therapy  Total # of Interventions Recommended:  11  Total # of Interventions Accepted:

## 2025-03-10 ENCOUNTER — ANTI-COAG VISIT (OUTPATIENT)
Age: 60
End: 2025-03-10
Payer: MEDICARE

## 2025-03-10 VITALS
SYSTOLIC BLOOD PRESSURE: 107 MMHG | DIASTOLIC BLOOD PRESSURE: 59 MMHG | HEART RATE: 76 BPM | BODY MASS INDEX: 27.92 KG/M2 | WEIGHT: 173 LBS

## 2025-03-10 DIAGNOSIS — Z95.2 S/P AVR (AORTIC VALVE REPLACEMENT): ICD-10-CM

## 2025-03-10 DIAGNOSIS — Z79.01 LONG TERM CURRENT USE OF ANTICOAGULANT THERAPY: Primary | ICD-10-CM

## 2025-03-10 LAB
INTERNATIONAL NORMALIZATION RATIO, POC: 1.9
PROTHROMBIN TIME, POC: 0

## 2025-03-10 PROCEDURE — 85610 PROTHROMBIN TIME: CPT | Performed by: COUNSELOR

## 2025-03-10 PROCEDURE — 99212 OFFICE O/P EST SF 10 MIN: CPT | Performed by: COUNSELOR

## 2025-03-10 NOTE — PROGRESS NOTES
[facility]  [facility]  ANTICOAGULATION        Referring Provider: Tiffany Bragg CNP     GOAL INR: 2 - 3     TODAY'S INR: 1.9     WARFARIN Dosage: Decrease warfarin to 2.5 mg po daily    INR (no units)   Date Value   2024 2.4   2024 2.4   2024 1.7   2024 2.1   2024 3.5   2024 3.6   2024 1.8     INR,(POC) (no units)   Date Value   03/10/2025 1.9   2025 7.9   2025 1.7   2024 2.6   10/23/2024 2.8   2024 3.3   2024 2.1       Hemoglobin   Date Value Ref Range Status   2021 10.3 (L) 11.9 - 15.1 g/dL Final     Hematocrit   Date Value Ref Range Status   2021 33.8 (L) 36.3 - 47.1 % Final     ALT   Date Value Ref Range Status   2021 14 5 - 33 U/L Final     AST   Date Value Ref Range Status   2021 21 <32 U/L Final       Medication changes:  No changes    Notes:    Fingerstick INR drawn per clinic protocol. Patient states no visible blood in urine and no black tarry stool. Patient reports one nosebleed 2 days ago. Denies any missed doses of warfarin. No change in other maintenance medications or in diet. Will recheck INR in 1 week. Patient acknowledges working in consult agreement with pharmacist as referred by his/her physician.     Patient was recently admitted to [facility] -25 for COPD exacerbation.  She has 11 pound weight decrease and reports appetite is slowly returning.  Patient is taking Tylenol 650 mg po every 4 hours PRN - almost routine dosing per patient - for back pain.   Activity level decreased.   Will decrease warfarin as above and recheck INR in 1 week.     For Pharmacy Admin Tracking Only    Intervention Detail: Adherence Monitorin and Dose Adjustment: 1, reason: Improve Adherence, Therapy De-escalation, Therapy Optimization  Total # of Interventions Recommended: 2  Total # of Interventions Accepted: 2  Time Spent (min): 20       Sofía Raymond RP

## 2025-03-18 ENCOUNTER — ANTI-COAG VISIT (OUTPATIENT)
Age: 60
End: 2025-03-18
Payer: MEDICARE

## 2025-03-18 VITALS
SYSTOLIC BLOOD PRESSURE: 122 MMHG | WEIGHT: 176 LBS | HEART RATE: 88 BPM | HEIGHT: 66 IN | BODY MASS INDEX: 28.28 KG/M2 | DIASTOLIC BLOOD PRESSURE: 71 MMHG

## 2025-03-18 DIAGNOSIS — Z79.01 LONG TERM CURRENT USE OF ANTICOAGULANT THERAPY: Primary | ICD-10-CM

## 2025-03-18 DIAGNOSIS — Z95.2 S/P AVR (AORTIC VALVE REPLACEMENT): ICD-10-CM

## 2025-03-18 LAB
INTERNATIONAL NORMALIZATION RATIO, POC: 2.6
PROTHROMBIN TIME, POC: 0

## 2025-03-18 PROCEDURE — 85610 PROTHROMBIN TIME: CPT | Performed by: FAMILY MEDICINE

## 2025-03-18 PROCEDURE — 99211 OFF/OP EST MAY X REQ PHY/QHP: CPT | Performed by: FAMILY MEDICINE

## 2025-03-18 NOTE — PATIENT INSTRUCTIONS
Continue to take warfarin 2.5mg (1 tablet) every day.  Continue to monitor urine and stool for signs and symptoms of bleeding.   Please notify the clinic of any medication changes.   Please remember to bring all medications (both prescription and OTC) to your next visit.   Kindly notify the clinic if you are unable to make to your next appointment.   Follow warfarin dosing instructions on dosing calendar provided.

## 2025-03-18 NOTE — PROGRESS NOTES
[facility]  ANTICO[facility]ULATION    Referring Provider: Tiffany Bragg CNP    GOAL INR: 2.0-3.0    TODAY'S INR: 2.6    WARFARIN Dosage: continue 2.5mg daily    INR (no units)   Date Value   2024 2.4   2024 2.4   2024 1.7   2024 2.1   2024 3.5   2024 3.6   2024 1.8     INR,(POC) (no units)   Date Value   2025 2.6   03/10/2025 1.9   2025 7.9   2025 1.7   2024 2.6   10/23/2024 2.8   2024 3.3       Hemoglobin   Date Value Ref Range Status   2021 10.3 (L) 11.9 - 15.1 g/dL Final     Hematocrit   Date Value Ref Range Status   2021 33.8 (L) 36.3 - 47.1 % Final     ALT   Date Value Ref Range Status   2021 14 5 - 33 U/L Final     AST   Date Value Ref Range Status   2021 21 <32 U/L Final       Medication changes:  No change    Notes:    Fingerstick INR drawn per clinic protocol. Patient states no visible blood in urine and no black tarry stool. Denies any missed doses of warfarin. No change in other maintenance medications or in diet. Will recheck INR in 2 weeks. Patient continues to improve following pneumonia with hospitalization.  Patient continues to have significant coughing, but \"it's bringing it up and getting better\".  Patient will continue warfarin dosing at current reduced dosing at this time.  Patient is seeing cardiology in about 1 week.  Patient acknowledges working in consult agreement with pharmacist as referred by his/her physician.                  For Pharmacy Admin Tracking Only    Intervention Detail: Adherence Monitorin  Total # of Interventions Recommended: 2  Total # of Interventions Accepted: 2  Time Spent (min): 20      FABIAN Lozano.Ph., 3/18/2025,9:40 AM

## 2025-04-02 ENCOUNTER — ANTI-COAG VISIT (OUTPATIENT)
Age: 60
End: 2025-04-02
Payer: MEDICARE

## 2025-04-02 VITALS — BODY MASS INDEX: 28.1 KG/M2 | DIASTOLIC BLOOD PRESSURE: 73 MMHG | WEIGHT: 174 LBS | SYSTOLIC BLOOD PRESSURE: 126 MMHG

## 2025-04-02 DIAGNOSIS — Z79.01 LONG TERM CURRENT USE OF ANTICOAGULANT THERAPY: Primary | ICD-10-CM

## 2025-04-02 DIAGNOSIS — Z95.2 S/P AVR (AORTIC VALVE REPLACEMENT): ICD-10-CM

## 2025-04-02 LAB
INTERNATIONAL NORMALIZATION RATIO, POC: 1.6
PROTHROMBIN TIME, POC: 0

## 2025-04-02 PROCEDURE — 85610 PROTHROMBIN TIME: CPT

## 2025-04-02 PROCEDURE — 99212 OFFICE O/P EST SF 10 MIN: CPT

## 2025-04-02 RX ORDER — ACETAMINOPHEN 500 MG
1000 TABLET ORAL 4 TIMES DAILY
COMMUNITY

## 2025-04-02 RX ORDER — TRAMADOL HYDROCHLORIDE 50 MG/1
50 TABLET ORAL 2 TIMES DAILY PRN
COMMUNITY
Start: 2025-03-26

## 2025-04-02 RX ORDER — FUROSEMIDE 20 MG/1
20 TABLET ORAL DAILY
COMMUNITY
Start: 2025-03-19

## 2025-04-02 RX ORDER — CALCITONIN SALMON 200 [IU]/.09ML
1 SPRAY, METERED NASAL DAILY
COMMUNITY
Start: 2025-03-25

## 2025-04-02 NOTE — PROGRESS NOTES
[facility]  [facility]UL[facility]    Referring Provider: Tiffany Bragg CNP     GOAL INR: 2 - 3     TODAY'S INR: 1.6     WARFARIN Dosage: Take warfarin 2 tablets for 5 mg today then increase warfarin to 2 tablets for 5 mg  and 2.5 mg tablet all other days.    INR (no units)   Date Value   2024 2.4   2024 2.4   2024 1.7   2024 2.1   2024 3.5   2024 3.6   2024 1.8     INR,(POC) (no units)   Date Value   2025 2.6   03/10/2025 1.9   2025 7.9   2025 1.7   2024 2.6   10/23/2024 2.8   2024 3.3       Hemoglobin   Date Value Ref Range Status   2021 10.3 (L) 11.9 - 15.1 g/dL Final     Hematocrit   Date Value Ref Range Status   2021 33.8 (L) 36.3 - 47.1 % Final     ALT   Date Value Ref Range Status   2021 14 5 - 33 U/L Final     AST   Date Value Ref Range Status   2021 21 <32 U/L Final       Medication changes:  Started Fosamax  Started Miacalcin nasal spray  Tylenol 1000 mg QID  Tramadol prn pain    Notes:    Fingerstick INR drawn per clinic protocol. Patient states no visible blood in urine and no black tarry stool. Denies any missed doses of warfarin. No change in other maintenance medications or in diet. Will recheck INR in 2 weeks. Patient acknowledges working in consult agreement with pharmacist as referred by his/her physician.      Taking Tylenol routinely has more vertebrae that have compression fractures T6-7 compromised vertebra, T8 fx.   Pt is in severe pain, affecting ADLs and quality of life.     Increase lasix to 40 mg daily for 5 days and then resume 20 mg daily. Will also increased potassium to 40 meq while taking increased dose of lasix and then can resume 20 meq.            For Pharmacy Admin Tracking Only    Intervention Detail: Adherence Monitorin, Dose Adjustment: 1, reason: Therapy Optimization, and New Rx: 6, reason: Needs Additional Therapy  Total # of

## 2025-04-18 ENCOUNTER — ANTI-COAG VISIT (OUTPATIENT)
Age: 60
End: 2025-04-18
Payer: MEDICARE

## 2025-04-18 VITALS
DIASTOLIC BLOOD PRESSURE: 64 MMHG | WEIGHT: 171 LBS | BODY MASS INDEX: 27.61 KG/M2 | SYSTOLIC BLOOD PRESSURE: 114 MMHG | HEART RATE: 69 BPM

## 2025-04-18 DIAGNOSIS — Z79.01 LONG TERM CURRENT USE OF ANTICOAGULANT THERAPY: Primary | ICD-10-CM

## 2025-04-18 DIAGNOSIS — Z95.2 S/P AVR (AORTIC VALVE REPLACEMENT): ICD-10-CM

## 2025-04-18 LAB
INTERNATIONAL NORMALIZATION RATIO, POC: 1.9
PROTHROMBIN TIME, POC: 0

## 2025-04-18 PROCEDURE — 99211 OFF/OP EST MAY X REQ PHY/QHP: CPT

## 2025-04-18 PROCEDURE — 85610 PROTHROMBIN TIME: CPT

## 2025-04-18 NOTE — PROGRESS NOTES
[facility]  [facility]ICO[facility]UL[facility]    Referring Provider: Tiffany Bragg CNP     GOAL INR: 2 - 3     TODAY'S INR: 1.9     WARFARIN Dosage: Take warfarin 2 tablets for 5 mg today then continue warfarin 2 tablets for 5 mg  and 2.5 mg tablet all other days.    INR (no units)   Date Value   2024 2.4   2024 2.4   2024 1.7   2024 2.1   2024 3.5   2024 3.6   2024 1.8     INR,(POC) (no units)   Date Value   2025 1.6   2025 2.6   03/10/2025 1.9   2025 7.9   2025 1.7   2024 2.6   10/23/2024 2.8       Hemoglobin   Date Value Ref Range Status   2021 10.3 (L) 11.9 - 15.1 g/dL Final     Hematocrit   Date Value Ref Range Status   2021 33.8 (L) 36.3 - 47.1 % Final     ALT   Date Value Ref Range Status   2021 14 5 - 33 U/L Final     AST   Date Value Ref Range Status   2021 21 <32 U/L Final       Medication changes:  none    Notes:    Fingerstick INR drawn per clinic protocol. Patient states no visible blood in urine and no black tarry stool. Denies any missed doses of warfarin. No change in other maintenance medications or in diet. Will recheck INR in 2 weeks. Patient acknowledges working in consult agreement with pharmacist as referred by his/her physician.            Taking Tylenol routinely has more vertebrae that have compression fractures T6-7 compromised vertebra, T8 fx.   Patient states her back pain is improved since last visit.                 For Pharmacy Admin Tracking Only    Intervention Detail: Adherence Monitorin and Dose Adjustment: 1, reason: Therapy Optimization  Total # of Interventions Recommended: 2  Total # of Interventions Accepted: 2  Time Spent (min): 20      Vee Rivera RPH, PharmD

## 2025-05-02 ENCOUNTER — ANTI-COAG VISIT (OUTPATIENT)
Age: 60
End: 2025-05-02
Payer: MEDICARE

## 2025-05-02 VITALS
DIASTOLIC BLOOD PRESSURE: 65 MMHG | SYSTOLIC BLOOD PRESSURE: 118 MMHG | WEIGHT: 175 LBS | BODY MASS INDEX: 28.26 KG/M2 | HEART RATE: 62 BPM

## 2025-05-02 DIAGNOSIS — Z79.01 LONG TERM CURRENT USE OF ANTICOAGULANT THERAPY: Primary | ICD-10-CM

## 2025-05-02 DIAGNOSIS — Z95.2 S/P AVR (AORTIC VALVE REPLACEMENT): ICD-10-CM

## 2025-05-02 LAB
INTERNATIONAL NORMALIZATION RATIO, POC: 1.4
PROTHROMBIN TIME, POC: 0

## 2025-05-02 PROCEDURE — 99212 OFFICE O/P EST SF 10 MIN: CPT | Performed by: COUNSELOR

## 2025-05-02 PROCEDURE — 85610 PROTHROMBIN TIME: CPT | Performed by: COUNSELOR

## 2025-05-02 NOTE — PROGRESS NOTES
[facility]  [facility]  ANTICOAGUL[facility]    Referring Provider: Tiffany Bragg NP    GOAL INR: 2-3    TODAY'S INR: 1.4    WARFARIN Dosage: Increase warfarin to 5 mg po every MWF and 2.5 mg po all other days    INR (no units)   Date Value   2024 2.4   2024 2.4   2024 1.7   2024 2.1   2024 3.5   2024 3.6   2024 1.8     INR,(POC) (no units)   Date Value   2025 1.4   2025 1.9   2025 1.6   2025 2.6   03/10/2025 1.9   2025 7.9   2025 1.7       Hemoglobin   Date Value Ref Range Status   2021 10.3 (L) 11.9 - 15.1 g/dL Final     Hematocrit   Date Value Ref Range Status   2021 33.8 (L) 36.3 - 47.1 % Final     ALT   Date Value Ref Range Status   2021 14 5 - 33 U/L Final     AST   Date Value Ref Range Status   2021 21 <32 U/L Final       Medication changes:  Not taking furosemide and KCl for 3 days - ran out - waiting on new Rx    Notes:    Fingerstick INR drawn per clinic protocol. Patient states no visible blood in urine and no black tarry stool. Denies any missed doses of warfarin. No change in other maintenance medications or in diet. Will recheck INR in 2 weeks. Patient acknowledges working in consult agreement with pharmacist as referred by his/her physician.        Patient denies missed doses.  She is taking less Tylenol as back pain is improving and she believes she is back to her baseline - prior to hospitalization in February.      For Pharmacy Admin Tracking Only    Intervention Detail: Adherence Monitorin and Dose Adjustment: 1, reason: Improve Adherence, Therapy Optimization  Total # of Interventions Recommended: 2  Total # of Interventions Accepted: 2  Time Spent (min): 20      Sofía Raymond [facility]

## 2025-05-16 ENCOUNTER — ANTI-COAG VISIT (OUTPATIENT)
Age: 60
End: 2025-05-16
Payer: MEDICARE

## 2025-05-16 VITALS
BODY MASS INDEX: 28.26 KG/M2 | HEART RATE: 62 BPM | SYSTOLIC BLOOD PRESSURE: 106 MMHG | DIASTOLIC BLOOD PRESSURE: 59 MMHG | WEIGHT: 175 LBS

## 2025-05-16 DIAGNOSIS — Z95.2 S/P AVR (AORTIC VALVE REPLACEMENT): ICD-10-CM

## 2025-05-16 DIAGNOSIS — Z79.01 LONG TERM CURRENT USE OF ANTICOAGULANT THERAPY: Primary | ICD-10-CM

## 2025-05-16 LAB
INTERNATIONAL NORMALIZATION RATIO, POC: 2.1
PROTHROMBIN TIME, POC: 0

## 2025-05-16 PROCEDURE — 85610 PROTHROMBIN TIME: CPT

## 2025-05-16 PROCEDURE — 99212 OFFICE O/P EST SF 10 MIN: CPT

## 2025-05-16 NOTE — PROGRESS NOTES
[facility]  Medication Management  ANTICOAGULATION    Referring Provider: Tiffany Bragg CNP     GOAL INR: 2 - 3     TODAY'S INR: 2.1     WARFARIN Dosage: Continue warfarin  2 tablets for 5 mg MWF and 2.5 mg tablet all other days.    INR (no units)   Date Value   2024 2.4   2024 2.4   2024 1.7   2024 2.1   2024 3.5   2024 3.6   2024 1.8     INR,(POC) (no units)   Date Value   2025 1.4   2025 1.9   2025 1.6   2025 2.6   03/10/2025 1.9   2025 7.9   2025 1.7       Hemoglobin   Date Value Ref Range Status   2021 10.3 (L) 11.9 - 15.1 g/dL Final     Hematocrit   Date Value Ref Range Status   2021 33.8 (L) 36.3 - 47.1 % Final     ALT   Date Value Ref Range Status   2021 14 5 - 33 U/L Final     AST   Date Value Ref Range Status   2021 21 <32 U/L Final       Medication changes:  Stopped Lasix 2 weeks ago  Stopped potassium    Notes:    Fingerstick INR drawn per clinic protocol. Patient states no visible blood in urine and no black tarry stool. Denies any missed doses of warfarin. No change in other maintenance medications or in diet. Will recheck INR in 3 weeks. Patient acknowledges working in consult agreement with pharmacist as referred by his/her physician.                  For Pharmacy Admin Tracking Only    Intervention Detail: Adherence Monitorin and New Rx: 2, reason: Needs Additional Therapy  Total # of Interventions Recommended: 3  Total # of Interventions Accepted: 3  Time Spent (min): 20      Vee Rivera RP, PharmD

## 2025-06-06 ENCOUNTER — ANTI-COAG VISIT (OUTPATIENT)
Age: 60
End: 2025-06-06
Payer: MEDICARE

## 2025-06-06 VITALS
WEIGHT: 171 LBS | DIASTOLIC BLOOD PRESSURE: 62 MMHG | SYSTOLIC BLOOD PRESSURE: 107 MMHG | BODY MASS INDEX: 27.61 KG/M2 | HEART RATE: 71 BPM

## 2025-06-06 DIAGNOSIS — Z79.01 LONG TERM CURRENT USE OF ANTICOAGULANT THERAPY: Primary | ICD-10-CM

## 2025-06-06 DIAGNOSIS — Z95.2 S/P AVR (AORTIC VALVE REPLACEMENT): ICD-10-CM

## 2025-06-06 LAB
INTERNATIONAL NORMALIZATION RATIO, POC: 2.8
PROTHROMBIN TIME, POC: 0

## 2025-06-06 PROCEDURE — 85610 PROTHROMBIN TIME: CPT

## 2025-06-06 PROCEDURE — 99211 OFF/OP EST MAY X REQ PHY/QHP: CPT

## 2025-06-06 RX ORDER — LORATADINE 10 MG/1
10 TABLET ORAL DAILY
COMMUNITY

## 2025-06-06 NOTE — PROGRESS NOTES
[facility]  Medication Management  ANTICOAGULATION    Referring Provider: Tiffany Bragg CNP     GOAL INR: 2 - 3     TODAY'S INR: 2.8    WARFARIN Dosage: Continue warfarin  2 tablets for 5 mg MWF and 2.5 mg tablet all other days.    INR (no units)   Date Value   2024 2.4   2024 2.4   2024 1.7   2024 2.1   2024 3.5   2024 3.6   2024 1.8     INR,(POC) (no units)   Date Value   2025 2.1   2025 1.4   2025 1.9   2025 1.6   2025 2.6   03/10/2025 1.9   2025 7.9       Hemoglobin   Date Value Ref Range Status   2021 10.3 (L) 11.9 - 15.1 g/dL Final     Hematocrit   Date Value Ref Range Status   2021 33.8 (L) 36.3 - 47.1 % Final     ALT   Date Value Ref Range Status   2021 14 5 - 33 U/L Final     AST   Date Value Ref Range Status   2021 21 <32 U/L Final       Medication changes:  Doxycycline x 5 days  Claritin    Notes:    Fingerstick INR drawn per clinic protocol. Patient states no visible blood in urine and no black tarry stool. Denies any missed doses of warfarin. No change in other maintenance medications or in diet. Will recheck INR in 2.5 weeks. Patient acknowledges working in consult agreement with pharmacist as referred by his/her physician.            Patient has 5 more days on Doxycycline for sinus infection.  She will continue Claritin 10mg daily indefinitely.      For Pharmacy Admin Tracking Only    Intervention Detail: Adherence Monitorin  Total # of Interventions Recommended: 1  Total # of Interventions Accepted: 2  Time Spent (min): 20      Mara Louise RPH, PharmD

## 2025-06-26 ENCOUNTER — APPOINTMENT (OUTPATIENT)
Age: 60
End: 2025-06-26
Payer: MEDICARE

## 2025-07-02 ENCOUNTER — ANTI-COAG VISIT (OUTPATIENT)
Age: 60
End: 2025-07-02
Payer: MEDICARE

## 2025-07-02 VITALS
SYSTOLIC BLOOD PRESSURE: 119 MMHG | DIASTOLIC BLOOD PRESSURE: 66 MMHG | WEIGHT: 165 LBS | BODY MASS INDEX: 26.64 KG/M2 | HEART RATE: 69 BPM

## 2025-07-02 DIAGNOSIS — Z79.01 LONG TERM CURRENT USE OF ANTICOAGULANT THERAPY: Primary | ICD-10-CM

## 2025-07-02 DIAGNOSIS — Z95.2 S/P AVR (AORTIC VALVE REPLACEMENT): ICD-10-CM

## 2025-07-02 LAB
INTERNATIONAL NORMALIZATION RATIO, POC: 2.9
PROTHROMBIN TIME, POC: 0

## 2025-07-02 PROCEDURE — 99211 OFF/OP EST MAY X REQ PHY/QHP: CPT

## 2025-07-02 PROCEDURE — 85610 PROTHROMBIN TIME: CPT

## 2025-07-02 RX ORDER — ALBUTEROL SULFATE 90 UG/1
2 INHALANT RESPIRATORY (INHALATION) EVERY 6 HOURS PRN
COMMUNITY
Start: 2025-06-03

## 2025-07-02 NOTE — PROGRESS NOTES
[facility]  Medication Management  ANTICOAGULATION    Referring Provider: Tiffany Bragg CNP     GOAL INR: 2 - 3     TODAY'S INR: 2.9     WARFARIN Dosage: Continue warfarin 2 tablets for 5 mg MWF and 2.5 mg tablet all other days.    INR (no units)   Date Value   2024 2.4   2024 2.4   2024 1.7   2024 2.1   2024 3.5   2024 3.6   2024 1.8     INR,(POC) (no units)   Date Value   2025 2.8   2025 2.1   2025 1.4   2025 1.9   2025 1.6   2025 2.6   03/10/2025 1.9       Hemoglobin   Date Value Ref Range Status   2021 10.3 (L) 11.9 - 15.1 g/dL Final     Hematocrit   Date Value Ref Range Status   2021 33.8 (L) 36.3 - 47.1 % Final     ALT   Date Value Ref Range Status   2021 14 5 - 33 U/L Final     AST   Date Value Ref Range Status   2021 21 <32 U/L Final       Medication changes:  Completed Doxycycline  Claritin    Notes:    Fingerstick INR drawn per clinic protocol. Patient states no visible blood in urine and no black tarry stool. Denies any missed doses of warfarin. No change in other maintenance medications or in diet. Will recheck INR in 5 weeks. Patient acknowledges working in consult agreement with pharmacist as referred by his/her physician.              Completed Doxycycline for sinus infection.   Claritin 10 mg daily.         For Pharmacy Admin Tracking Only    Intervention Detail: Adherence Monitorin and New Rx: 2, reason: Needs Additional Therapy, Patient Preference  Total # of Interventions Recommended: 3  Total # of Interventions Accepted: 3  Time Spent (min): 20      Vee Rivera [facility], PharmD

## 2025-07-16 ENCOUNTER — TRANSCRIBE ORDERS (OUTPATIENT)
Dept: ADMINISTRATIVE | Age: 60
End: 2025-07-16

## 2025-07-16 DIAGNOSIS — Z13.820 ENCOUNTER FOR OSTEOPOROSIS SCREENING IN ASYMPTOMATIC POSTMENOPAUSAL PATIENT: ICD-10-CM

## 2025-07-16 DIAGNOSIS — Z78.0 HISTORY OF MENOPAUSE: Primary | ICD-10-CM

## 2025-07-16 DIAGNOSIS — Z78.0 ENCOUNTER FOR OSTEOPOROSIS SCREENING IN ASYMPTOMATIC POSTMENOPAUSAL PATIENT: ICD-10-CM

## 2025-07-17 ENCOUNTER — ANTI-COAG VISIT (OUTPATIENT)
Age: 60
End: 2025-07-17
Payer: MEDICARE

## 2025-07-17 ENCOUNTER — TRANSCRIBE ORDERS (OUTPATIENT)
Dept: ADMINISTRATIVE | Age: 60
End: 2025-07-17

## 2025-07-17 VITALS
SYSTOLIC BLOOD PRESSURE: 107 MMHG | HEART RATE: 68 BPM | WEIGHT: 163 LBS | DIASTOLIC BLOOD PRESSURE: 65 MMHG | BODY MASS INDEX: 26.32 KG/M2

## 2025-07-17 DIAGNOSIS — Z95.2 S/P AVR (AORTIC VALVE REPLACEMENT): ICD-10-CM

## 2025-07-17 DIAGNOSIS — R92.8 ABNORMAL MAMMOGRAM: Primary | ICD-10-CM

## 2025-07-17 DIAGNOSIS — Z79.01 LONG TERM CURRENT USE OF ANTICOAGULANT THERAPY: Primary | ICD-10-CM

## 2025-07-17 DIAGNOSIS — Z12.31 BREAST CANCER SCREENING BY MAMMOGRAM: ICD-10-CM

## 2025-07-17 LAB
INTERNATIONAL NORMALIZATION RATIO, POC: 3.2
PROTHROMBIN TIME, POC: 0

## 2025-07-17 PROCEDURE — 85610 PROTHROMBIN TIME: CPT

## 2025-07-17 PROCEDURE — 99213 OFFICE O/P EST LOW 20 MIN: CPT

## 2025-07-17 RX ORDER — FERROUS SULFATE 325(65) MG
325 TABLET ORAL
COMMUNITY
Start: 2025-07-12

## 2025-07-17 RX ORDER — GUAIFENESIN 600 MG/1
600 TABLET, EXTENDED RELEASE ORAL 2 TIMES DAILY
COMMUNITY
Start: 2025-07-12 | End: 2025-07-17

## 2025-07-17 RX ORDER — CEFDINIR 300 MG/1
300 CAPSULE ORAL 2 TIMES DAILY
COMMUNITY
Start: 2025-07-12 | End: 2025-07-17

## 2025-07-17 RX ORDER — BENZONATATE 100 MG/1
100 CAPSULE ORAL 3 TIMES DAILY PRN
COMMUNITY
Start: 2025-07-12

## 2025-07-17 RX ORDER — PREDNISONE 10 MG/1
TABLET ORAL SEE ADMIN INSTRUCTIONS
COMMUNITY
Start: 2025-07-12

## 2025-07-17 RX ORDER — FUROSEMIDE 20 MG/1
20 TABLET ORAL EVERY MORNING
COMMUNITY
Start: 2025-07-13 | End: 2025-08-12

## 2025-07-17 RX ORDER — DOXYCYCLINE 100 MG/1
100 CAPSULE ORAL 2 TIMES DAILY
COMMUNITY
Start: 2025-07-12

## 2025-07-17 NOTE — PROGRESS NOTES
[facility]  [facility]  ANTICOAGUL[facility]    Referring Provider: Tiffany Bragg CNP     GOAL INR: 2 - 3     TODAY'S INR: 3.2     WARFARIN Dosage: Hold warfarin today then continue warfarin 2 tablets for 5 mg MWF and 2.5 mg tablet all other days.    INR (no units)   Date Value   2024 2.4   2024 2.4   2024 1.7   2024 2.1   2024 3.5   2024 3.6   2024 1.8     INR,(POC) (no units)   Date Value   2025 2.9   2025 2.8   2025 2.1   2025 1.4   2025 1.9   2025 1.6   2025 2.6       Hemoglobin   Date Value Ref Range Status   2021 10.3 (L) 11.9 - 15.1 g/dL Final     Hematocrit   Date Value Ref Range Status   2021 33.8 (L) 36.3 - 47.1 % Final     ALT   Date Value Ref Range Status   2021 14 5 - 33 U/L Final     AST   Date Value Ref Range Status   2021 21 <32 U/L Final       Medication changes:  Tessalon   Omnicef for 4 more days  Doxycycline for 4 more days  Mucinex  Lasix 20 mg daily  Ferrous sulfate daily  Prednisone taper     Notes:    Fingerstick INR drawn per clinic protocol. Patient states no visible blood in urine and no black tarry stool. Denies any missed doses of warfarin. No change in other maintenance medications or in diet. Will recheck INR in 3 weeks. Patient acknowledges working in consult agreement with pharmacist as referred by his/her physician.    Recently discharged from [facility] 25- 25 for pneumonia with many medication changes listed above.          For Pharmacy Admin Tracking Only    Intervention Detail: Adherence Monitorin, Dose Adjustment: 1, reason: Therapy Optimization, and New Rx: 7, reason: Needs Additional Therapy  Total # of Interventions Recommended: 9  Total # of Interventions Accepted: 9  Time Spent (min): 30      Vee Rivera RPH, PharmD

## 2025-07-29 ENCOUNTER — HOSPITAL ENCOUNTER (OUTPATIENT)
Dept: GENERAL RADIOLOGY | Age: 60
Discharge: HOME | End: 2025-07-31
Payer: MEDICARE

## 2025-07-29 ENCOUNTER — HOSPITAL ENCOUNTER (OUTPATIENT)
Age: 60
Discharge: HOME | End: 2025-07-29
Payer: MEDICARE

## 2025-07-29 DIAGNOSIS — J15.9 PNEUMONIA, BACTERIAL: ICD-10-CM

## 2025-07-29 LAB
ALBUMIN SERPL-MCNC: 3.9 G/DL (ref 3.5–5.2)
ALBUMIN/GLOB SERPL: 1.6 {RATIO} (ref 1–2.5)
ALP SERPL-CCNC: 74 U/L (ref 35–104)
ALT SERPL-CCNC: 29 U/L (ref 10–35)
ANION GAP SERPL CALCULATED.3IONS-SCNC: 11 MMOL/L (ref 9–16)
AST SERPL-CCNC: 35 U/L (ref 10–35)
BILIRUB SERPL-MCNC: 0.5 MG/DL (ref 0–1.2)
BUN SERPL-MCNC: 10 MG/DL (ref 8–23)
BUN/CREAT SERPL: 8 (ref 9–20)
CALCIUM SERPL-MCNC: 8.4 MG/DL (ref 8.6–10.4)
CHLORIDE SERPL-SCNC: 105 MMOL/L (ref 98–107)
CO2 SERPL-SCNC: 24 MMOL/L (ref 20–31)
CREAT SERPL-MCNC: 1.2 MG/DL (ref 0.5–0.9)
GFR, ESTIMATED: 51 ML/MIN/1.73M2
GLUCOSE SERPL-MCNC: 86 MG/DL (ref 74–99)
POTASSIUM SERPL-SCNC: 4.3 MMOL/L (ref 3.7–5.3)
PROT SERPL-MCNC: 6.4 G/DL (ref 6.6–8.7)
SODIUM SERPL-SCNC: 140 MMOL/L (ref 136–145)

## 2025-07-29 PROCEDURE — 80053 COMPREHEN METABOLIC PANEL: CPT

## 2025-07-29 PROCEDURE — 36415 COLL VENOUS BLD VENIPUNCTURE: CPT

## 2025-07-29 PROCEDURE — 71046 X-RAY EXAM CHEST 2 VIEWS: CPT

## 2025-08-07 ENCOUNTER — ANTI-COAG VISIT (OUTPATIENT)
Age: 60
End: 2025-08-07
Payer: MEDICARE

## 2025-08-07 VITALS
BODY MASS INDEX: 26.64 KG/M2 | HEART RATE: 75 BPM | DIASTOLIC BLOOD PRESSURE: 67 MMHG | WEIGHT: 165 LBS | SYSTOLIC BLOOD PRESSURE: 112 MMHG

## 2025-08-07 DIAGNOSIS — Z95.2 S/P AVR (AORTIC VALVE REPLACEMENT): ICD-10-CM

## 2025-08-07 DIAGNOSIS — Z79.01 LONG TERM CURRENT USE OF ANTICOAGULANT THERAPY: Primary | ICD-10-CM

## 2025-08-07 LAB
INTERNATIONAL NORMALIZATION RATIO, POC: 1.9
PROTHROMBIN TIME, POC: 0

## 2025-08-07 PROCEDURE — 99212 OFFICE O/P EST SF 10 MIN: CPT

## 2025-08-07 PROCEDURE — 85610 PROTHROMBIN TIME: CPT

## 2025-08-07 RX ORDER — POTASSIUM CHLORIDE 1500 MG/1
20 TABLET, EXTENDED RELEASE ORAL DAILY
COMMUNITY
Start: 2025-07-18

## 2025-08-21 ENCOUNTER — HOSPITAL ENCOUNTER (OUTPATIENT)
Dept: WOMENS IMAGING | Age: 60
Discharge: HOME | End: 2025-08-23
Payer: MEDICARE

## 2025-08-21 DIAGNOSIS — Z78.0 HISTORY OF MENOPAUSE: ICD-10-CM

## 2025-08-21 DIAGNOSIS — Z78.0 ENCOUNTER FOR OSTEOPOROSIS SCREENING IN ASYMPTOMATIC POSTMENOPAUSAL PATIENT: ICD-10-CM

## 2025-08-21 DIAGNOSIS — Z13.820 ENCOUNTER FOR OSTEOPOROSIS SCREENING IN ASYMPTOMATIC POSTMENOPAUSAL PATIENT: ICD-10-CM

## 2025-08-21 PROCEDURE — 77080 DXA BONE DENSITY AXIAL: CPT

## 2025-09-04 ENCOUNTER — ANTI-COAG VISIT (OUTPATIENT)
Age: 60
End: 2025-09-04
Payer: MEDICARE

## 2025-09-04 VITALS
SYSTOLIC BLOOD PRESSURE: 102 MMHG | DIASTOLIC BLOOD PRESSURE: 63 MMHG | HEART RATE: 78 BPM | BODY MASS INDEX: 27.13 KG/M2 | WEIGHT: 168 LBS

## 2025-09-04 DIAGNOSIS — Z95.2 S/P AVR (AORTIC VALVE REPLACEMENT): ICD-10-CM

## 2025-09-04 DIAGNOSIS — Z79.01 LONG TERM CURRENT USE OF ANTICOAGULANT THERAPY: Primary | ICD-10-CM

## 2025-09-04 LAB
INTERNATIONAL NORMALIZATION RATIO, POC: 2.4
PROTHROMBIN TIME, POC: NORMAL

## 2025-09-04 PROCEDURE — 99212 OFFICE O/P EST SF 10 MIN: CPT

## 2025-09-04 PROCEDURE — 85610 PROTHROMBIN TIME: CPT

## 2025-09-04 RX ORDER — FUROSEMIDE 20 MG/1
20 TABLET ORAL DAILY
COMMUNITY
Start: 2025-08-15

## 2025-09-04 RX ORDER — PREDNISONE 20 MG/1
20 TABLET ORAL
COMMUNITY
Start: 2025-08-14

## (undated) DEVICE — MEDI-VAC NON-CONDUCTIVE TUBING7MM X 30.5 (100FT): Brand: CARDINAL HEALTH

## (undated) DEVICE — SOLUTION IV IRRIG POUR BRL 0.9% SODIUM CHL 2F7124

## (undated) DEVICE — CANNULA ORAL NSL AD CO2 N INTUB O2 DEL DISP TRU LNK